# Patient Record
Sex: MALE | Race: WHITE | NOT HISPANIC OR LATINO | URBAN - METROPOLITAN AREA
[De-identification: names, ages, dates, MRNs, and addresses within clinical notes are randomized per-mention and may not be internally consistent; named-entity substitution may affect disease eponyms.]

---

## 2017-01-17 ENCOUNTER — OUTPATIENT (OUTPATIENT)
Dept: OUTPATIENT SERVICES | Facility: HOSPITAL | Age: 77
LOS: 1 days | End: 2017-01-17
Payer: MEDICARE

## 2017-01-17 DIAGNOSIS — J84.9 INTERSTITIAL PULMONARY DISEASE, UNSPECIFIED: ICD-10-CM

## 2017-02-10 ENCOUNTER — APPOINTMENT (OUTPATIENT)
Dept: PULMONOLOGY | Facility: CLINIC | Age: 77
End: 2017-02-10

## 2017-02-10 VITALS
SYSTOLIC BLOOD PRESSURE: 134 MMHG | BODY MASS INDEX: 37.03 KG/M2 | DIASTOLIC BLOOD PRESSURE: 62 MMHG | WEIGHT: 240 LBS | HEART RATE: 64 BPM | OXYGEN SATURATION: 94 %

## 2017-03-17 ENCOUNTER — TRANSCRIPTION ENCOUNTER (OUTPATIENT)
Age: 77
End: 2017-03-17

## 2017-03-21 PROCEDURE — 94618 PULMONARY STRESS TESTING: CPT

## 2017-03-21 PROCEDURE — G0239: CPT

## 2017-03-31 ENCOUNTER — LABORATORY RESULT (OUTPATIENT)
Age: 77
End: 2017-03-31

## 2017-03-31 ENCOUNTER — APPOINTMENT (OUTPATIENT)
Dept: INTERNAL MEDICINE | Facility: CLINIC | Age: 77
End: 2017-03-31

## 2017-03-31 ENCOUNTER — RESULT REVIEW (OUTPATIENT)
Age: 77
End: 2017-03-31

## 2017-03-31 VITALS — WEIGHT: 239 LBS | BODY MASS INDEX: 37.07 KG/M2 | HEIGHT: 67.5 IN

## 2017-03-31 LAB
BILIRUB UR QL STRIP: NORMAL
GLUCOSE UR-MCNC: NORMAL
HBA1C MFR BLD HPLC: 6
HCG UR QL: 0.2 EU/DL
HGB UR QL STRIP.AUTO: NORMAL
KETONES UR-MCNC: NORMAL
LEUKOCYTE ESTERASE UR QL STRIP: NORMAL
NITRITE UR QL STRIP: NORMAL
PH UR STRIP: 5.5
PROT UR STRIP-MCNC: NORMAL
SP GR UR STRIP: 1.02

## 2017-04-01 LAB
ALBUMIN SERPL ELPH-MCNC: 4 G/DL
ALP BLD-CCNC: 48 U/L
ALT SERPL-CCNC: 20 U/L
ANION GAP SERPL CALC-SCNC: 16 MMOL/L
AST SERPL-CCNC: 36 U/L
BASOPHILS # BLD AUTO: 0.04 K/UL
BASOPHILS NFR BLD AUTO: 0.6 %
BILIRUB SERPL-MCNC: 0.5 MG/DL
BUN SERPL-MCNC: 17 MG/DL
CALCIUM SERPL-MCNC: 9.4 MG/DL
CHLORIDE SERPL-SCNC: 103 MMOL/L
CHOLEST SERPL-MCNC: 161 MG/DL
CHOLEST/HDLC SERPL: 3.4 RATIO
CO2 SERPL-SCNC: 24 MMOL/L
CREAT SERPL-MCNC: 1.02 MG/DL
EOSINOPHIL # BLD AUTO: 0.35 K/UL
EOSINOPHIL NFR BLD AUTO: 5.1 %
GLUCOSE SERPL-MCNC: 99 MG/DL
HCT VFR BLD CALC: 44 %
HDLC SERPL-MCNC: 47 MG/DL
HGB BLD-MCNC: 14.4 G/DL
IMM GRANULOCYTES NFR BLD AUTO: 0.3 %
LDLC SERPL CALC-MCNC: 80 MG/DL
LYMPHOCYTES # BLD AUTO: 1.63 K/UL
LYMPHOCYTES NFR BLD AUTO: 23.8 %
MAN DIFF?: NORMAL
MCHC RBC-ENTMCNC: 30.8 PG
MCHC RBC-ENTMCNC: 32.7 GM/DL
MCV RBC AUTO: 94 FL
MONOCYTES # BLD AUTO: 0.65 K/UL
MONOCYTES NFR BLD AUTO: 9.5 %
NEUTROPHILS # BLD AUTO: 4.16 K/UL
NEUTROPHILS NFR BLD AUTO: 60.7 %
PLATELET # BLD AUTO: 217 K/UL
POTASSIUM SERPL-SCNC: 4.7 MMOL/L
PROT SERPL-MCNC: 6.7 G/DL
PSA FREE FLD-MCNC: 14.2 %
PSA FREE SERPL-MCNC: 0.61 NG/ML
PSA SERPL-MCNC: 4.29 NG/ML
RBC # BLD: 4.68 M/UL
RBC # FLD: 14 %
SODIUM SERPL-SCNC: 143 MMOL/L
TRIGL SERPL-MCNC: 168 MG/DL
TSH SERPL-ACNC: 4.84 UIU/ML
WBC # FLD AUTO: 6.85 K/UL

## 2017-04-03 ENCOUNTER — RECORD ABSTRACTING (OUTPATIENT)
Age: 77
End: 2017-04-03

## 2017-04-03 RX ORDER — PIRFENIDONE 267 MG/1
CAPSULE ORAL
Refills: 0 | Status: DISCONTINUED | COMMUNITY
End: 2017-04-03

## 2017-04-04 ENCOUNTER — RESULT REVIEW (OUTPATIENT)
Age: 77
End: 2017-04-04

## 2017-06-06 ENCOUNTER — APPOINTMENT (OUTPATIENT)
Dept: PULMONOLOGY | Facility: CLINIC | Age: 77
End: 2017-06-06

## 2017-06-06 VITALS
WEIGHT: 239 LBS | RESPIRATION RATE: 16 BRPM | SYSTOLIC BLOOD PRESSURE: 128 MMHG | HEART RATE: 65 BPM | OXYGEN SATURATION: 93 % | BODY MASS INDEX: 36.88 KG/M2 | DIASTOLIC BLOOD PRESSURE: 72 MMHG

## 2017-06-28 ENCOUNTER — APPOINTMENT (OUTPATIENT)
Dept: PULMONOLOGY | Facility: CLINIC | Age: 77
End: 2017-06-28

## 2017-06-28 VITALS
SYSTOLIC BLOOD PRESSURE: 136 MMHG | BODY MASS INDEX: 36.42 KG/M2 | DIASTOLIC BLOOD PRESSURE: 60 MMHG | OXYGEN SATURATION: 93 % | WEIGHT: 236 LBS | HEART RATE: 66 BPM

## 2017-06-28 RX ORDER — AMOXICILLIN 500 MG/1
500 CAPSULE ORAL
Qty: 12 | Refills: 0 | Status: DISCONTINUED | COMMUNITY
Start: 2016-11-07

## 2017-07-12 ENCOUNTER — APPOINTMENT (OUTPATIENT)
Dept: PULMONOLOGY | Facility: CLINIC | Age: 77
End: 2017-07-12

## 2017-07-12 VITALS — BODY MASS INDEX: 35.89 KG/M2 | WEIGHT: 232.6 LBS | DIASTOLIC BLOOD PRESSURE: 62 MMHG | SYSTOLIC BLOOD PRESSURE: 132 MMHG

## 2017-07-12 VITALS — HEART RATE: 73 BPM | OXYGEN SATURATION: 91 %

## 2017-07-12 VITALS — OXYGEN SATURATION: 94 %

## 2017-07-12 RX ORDER — CEFUROXIME AXETIL 500 MG/1
500 TABLET ORAL
Refills: 0 | Status: DISCONTINUED | COMMUNITY
Start: 2017-06-28 | End: 2017-07-12

## 2017-07-19 ENCOUNTER — APPOINTMENT (OUTPATIENT)
Dept: INTERNAL MEDICINE | Facility: CLINIC | Age: 77
End: 2017-07-19

## 2017-07-20 ENCOUNTER — RESULT REVIEW (OUTPATIENT)
Age: 77
End: 2017-07-20

## 2017-07-20 LAB
ANION GAP SERPL CALC-SCNC: 15 MMOL/L
BASOPHILS # BLD AUTO: 0.03 K/UL
BASOPHILS NFR BLD AUTO: 0.4 %
BUN SERPL-MCNC: 17 MG/DL
CALCIUM SERPL-MCNC: 9.6 MG/DL
CHLORIDE SERPL-SCNC: 100 MMOL/L
CHOLEST SERPL-MCNC: 148 MG/DL
CHOLEST/HDLC SERPL: 3.4 RATIO
CO2 SERPL-SCNC: 24 MMOL/L
CREAT SERPL-MCNC: 1.09 MG/DL
EOSINOPHIL # BLD AUTO: 0.42 K/UL
EOSINOPHIL NFR BLD AUTO: 5.9 %
GLUCOSE SERPL-MCNC: 108 MG/DL
HCT VFR BLD CALC: 41.1 %
HDLC SERPL-MCNC: 44 MG/DL
HGB BLD-MCNC: 13.4 G/DL
IMM GRANULOCYTES NFR BLD AUTO: 0.3 %
LDLC SERPL CALC-MCNC: 85 MG/DL
LYMPHOCYTES # BLD AUTO: 1.17 K/UL
LYMPHOCYTES NFR BLD AUTO: 16.4 %
MAGNESIUM SERPL-MCNC: 2.3 MG/DL
MAN DIFF?: NORMAL
MCHC RBC-ENTMCNC: 31 PG
MCHC RBC-ENTMCNC: 32.6 GM/DL
MCV RBC AUTO: 95.1 FL
MONOCYTES # BLD AUTO: 0.85 K/UL
MONOCYTES NFR BLD AUTO: 11.9 %
NEUTROPHILS # BLD AUTO: 4.63 K/UL
NEUTROPHILS NFR BLD AUTO: 65.1 %
PLATELET # BLD AUTO: 299 K/UL
POTASSIUM SERPL-SCNC: 4.8 MMOL/L
RBC # BLD: 4.32 M/UL
RBC # FLD: 14.5 %
SODIUM SERPL-SCNC: 139 MMOL/L
T4 FREE SERPL-MCNC: 1.2 NG/DL
TRIGL SERPL-MCNC: 96 MG/DL
TSH SERPL-ACNC: 1.7 UIU/ML
WBC # FLD AUTO: 7.12 K/UL

## 2017-08-23 ENCOUNTER — APPOINTMENT (OUTPATIENT)
Dept: PULMONOLOGY | Facility: CLINIC | Age: 77
End: 2017-08-23
Payer: MEDICARE

## 2017-08-23 VITALS
WEIGHT: 229 LBS | BODY MASS INDEX: 35.52 KG/M2 | HEART RATE: 73 BPM | DIASTOLIC BLOOD PRESSURE: 68 MMHG | OXYGEN SATURATION: 87 % | RESPIRATION RATE: 14 BRPM | TEMPERATURE: 99.1 F | HEIGHT: 67.5 IN | SYSTOLIC BLOOD PRESSURE: 120 MMHG

## 2017-08-23 VITALS — OXYGEN SATURATION: 92 %

## 2017-08-23 PROCEDURE — 94761 N-INVAS EAR/PLS OXIMETRY MLT: CPT

## 2017-08-23 PROCEDURE — 99215 OFFICE O/P EST HI 40 MIN: CPT | Mod: 25

## 2017-09-07 ENCOUNTER — APPOINTMENT (OUTPATIENT)
Dept: PULMONOLOGY | Facility: CLINIC | Age: 77
End: 2017-09-07

## 2017-09-25 ENCOUNTER — RX RENEWAL (OUTPATIENT)
Age: 77
End: 2017-09-25

## 2017-10-16 ENCOUNTER — APPOINTMENT (OUTPATIENT)
Dept: PULMONOLOGY | Facility: CLINIC | Age: 77
End: 2017-10-16
Payer: MEDICARE

## 2017-10-16 VITALS — BODY MASS INDEX: 36.34 KG/M2 | WEIGHT: 235.5 LBS

## 2017-10-16 VITALS
HEART RATE: 72 BPM | DIASTOLIC BLOOD PRESSURE: 62 MMHG | OXYGEN SATURATION: 93 % | SYSTOLIC BLOOD PRESSURE: 110 MMHG | RESPIRATION RATE: 14 BRPM

## 2017-10-16 PROCEDURE — 94729 DIFFUSING CAPACITY: CPT

## 2017-10-16 PROCEDURE — 85018 HEMOGLOBIN: CPT | Mod: QW

## 2017-10-16 PROCEDURE — 94010 BREATHING CAPACITY TEST: CPT

## 2017-10-16 PROCEDURE — 94727 GAS DIL/WSHOT DETER LNG VOL: CPT

## 2017-10-16 PROCEDURE — 99215 OFFICE O/P EST HI 40 MIN: CPT | Mod: 25

## 2017-10-22 ENCOUNTER — TRANSCRIPTION ENCOUNTER (OUTPATIENT)
Age: 77
End: 2017-10-22

## 2017-10-30 ENCOUNTER — TRANSCRIPTION ENCOUNTER (OUTPATIENT)
Age: 77
End: 2017-10-30

## 2017-11-08 ENCOUNTER — TRANSCRIPTION ENCOUNTER (OUTPATIENT)
Age: 77
End: 2017-11-08

## 2017-11-14 ENCOUNTER — APPOINTMENT (OUTPATIENT)
Dept: INTERNAL MEDICINE | Facility: CLINIC | Age: 77
End: 2017-11-14
Payer: MEDICARE

## 2017-11-14 VITALS
BODY MASS INDEX: 35.06 KG/M2 | HEART RATE: 67 BPM | WEIGHT: 226 LBS | OXYGEN SATURATION: 92 % | HEIGHT: 67.5 IN | SYSTOLIC BLOOD PRESSURE: 110 MMHG | DIASTOLIC BLOOD PRESSURE: 60 MMHG

## 2017-11-14 PROCEDURE — 99214 OFFICE O/P EST MOD 30 MIN: CPT | Mod: 25

## 2017-11-14 PROCEDURE — 36415 COLL VENOUS BLD VENIPUNCTURE: CPT

## 2017-11-14 PROCEDURE — 90670 PCV13 VACCINE IM: CPT

## 2017-11-14 PROCEDURE — G0009: CPT

## 2017-11-17 ENCOUNTER — CHART COPY (OUTPATIENT)
Age: 77
End: 2017-11-17

## 2017-11-17 LAB
ALBUMIN SERPL ELPH-MCNC: 4 G/DL
ALP BLD-CCNC: 55 U/L
ALT SERPL-CCNC: 18 U/L
ANION GAP SERPL CALC-SCNC: 16 MMOL/L
AST SERPL-CCNC: 35 U/L
BILIRUB SERPL-MCNC: 0.5 MG/DL
BUN SERPL-MCNC: 18 MG/DL
CALCIUM SERPL-MCNC: 10.2 MG/DL
CHLORIDE SERPL-SCNC: 102 MMOL/L
CHOLEST SERPL-MCNC: 140 MG/DL
CHOLEST/HDLC SERPL: 3.2 RATIO
CO2 SERPL-SCNC: 23 MMOL/L
CREAT SERPL-MCNC: 1.2 MG/DL
GLUCOSE SERPL-MCNC: 102 MG/DL
HDLC SERPL-MCNC: 44 MG/DL
LDLC SERPL CALC-MCNC: 71 MG/DL
POTASSIUM SERPL-SCNC: 5 MMOL/L
PROT SERPL-MCNC: 7.3 G/DL
PSA SERPL-MCNC: 4.91 NG/ML
SODIUM SERPL-SCNC: 141 MMOL/L
TRIGL SERPL-MCNC: 124 MG/DL

## 2017-11-27 ENCOUNTER — RX RENEWAL (OUTPATIENT)
Age: 77
End: 2017-11-27

## 2017-12-29 ENCOUNTER — MEDICATION RENEWAL (OUTPATIENT)
Age: 77
End: 2017-12-29

## 2018-02-12 ENCOUNTER — APPOINTMENT (OUTPATIENT)
Dept: INTERNAL MEDICINE | Facility: CLINIC | Age: 78
End: 2018-02-12
Payer: MEDICARE

## 2018-02-12 VITALS
BODY MASS INDEX: 35.68 KG/M2 | WEIGHT: 230 LBS | HEART RATE: 66 BPM | DIASTOLIC BLOOD PRESSURE: 70 MMHG | HEIGHT: 67.5 IN | SYSTOLIC BLOOD PRESSURE: 118 MMHG | OXYGEN SATURATION: 93 %

## 2018-02-12 DIAGNOSIS — Z23 ENCOUNTER FOR IMMUNIZATION: ICD-10-CM

## 2018-02-12 DIAGNOSIS — Z92.29 PERSONAL HISTORY OF OTHER DRUG THERAPY: ICD-10-CM

## 2018-02-12 PROCEDURE — 99214 OFFICE O/P EST MOD 30 MIN: CPT | Mod: 25

## 2018-02-12 PROCEDURE — 36415 COLL VENOUS BLD VENIPUNCTURE: CPT

## 2018-02-13 LAB
CHOLEST SERPL-MCNC: 153 MG/DL
CHOLEST/HDLC SERPL: 3.2 RATIO
HDLC SERPL-MCNC: 48 MG/DL
LDLC SERPL CALC-MCNC: 79 MG/DL
TRIGL SERPL-MCNC: 129 MG/DL

## 2018-02-14 LAB
ALBUMIN SERPL ELPH-MCNC: 4.1 G/DL
ALP BLD-CCNC: 54 U/L
ALT SERPL-CCNC: 20 U/L
ANION GAP SERPL CALC-SCNC: 13 MMOL/L
AST SERPL-CCNC: 35 U/L
BILIRUB SERPL-MCNC: 0.5 MG/DL
BUN SERPL-MCNC: 19 MG/DL
CALCIUM SERPL-MCNC: 9.8 MG/DL
CHLORIDE SERPL-SCNC: 103 MMOL/L
CO2 SERPL-SCNC: 26 MMOL/L
CREAT SERPL-MCNC: 1.19 MG/DL
GLUCOSE SERPL-MCNC: 108 MG/DL
HBA1C MFR BLD HPLC: 6 %
POTASSIUM SERPL-SCNC: 5.4 MMOL/L
PROT SERPL-MCNC: 7.2 G/DL
PSA SERPL-MCNC: 5.41 NG/ML
SODIUM SERPL-SCNC: 142 MMOL/L

## 2018-02-22 ENCOUNTER — APPOINTMENT (OUTPATIENT)
Dept: UROLOGY | Facility: CLINIC | Age: 78
End: 2018-02-22
Payer: MEDICARE

## 2018-02-22 VITALS
HEART RATE: 67 BPM | OXYGEN SATURATION: 97 % | BODY MASS INDEX: 34.86 KG/M2 | WEIGHT: 230 LBS | SYSTOLIC BLOOD PRESSURE: 129 MMHG | HEIGHT: 68 IN | DIASTOLIC BLOOD PRESSURE: 84 MMHG

## 2018-02-22 DIAGNOSIS — Z87.891 PERSONAL HISTORY OF NICOTINE DEPENDENCE: ICD-10-CM

## 2018-02-22 DIAGNOSIS — Z87.09 PERSONAL HISTORY OF OTHER DISEASES OF THE RESPIRATORY SYSTEM: ICD-10-CM

## 2018-02-22 PROCEDURE — 99205 OFFICE O/P NEW HI 60 MIN: CPT

## 2018-02-23 ENCOUNTER — APPOINTMENT (OUTPATIENT)
Dept: UROLOGY | Facility: CLINIC | Age: 78
End: 2018-02-23

## 2018-02-23 LAB
BILIRUB UR QL STRIP: NORMAL
CLARITY UR: CLEAR
COLLECTION METHOD: NORMAL
GLUCOSE UR-MCNC: NORMAL
HCG UR QL: 0.2 EU/DL
HGB UR QL STRIP.AUTO: NORMAL
KETONES UR-MCNC: NORMAL
LEUKOCYTE ESTERASE UR QL STRIP: NORMAL
NITRITE UR QL STRIP: NORMAL
PH UR STRIP: 5.5
PROT UR STRIP-MCNC: NORMAL
SP GR UR STRIP: 1.03

## 2018-03-12 ENCOUNTER — APPOINTMENT (OUTPATIENT)
Dept: PULMONOLOGY | Facility: CLINIC | Age: 78
End: 2018-03-12
Payer: MEDICARE

## 2018-03-12 VITALS
DIASTOLIC BLOOD PRESSURE: 60 MMHG | OXYGEN SATURATION: 92 % | BODY MASS INDEX: 35.01 KG/M2 | HEIGHT: 68 IN | SYSTOLIC BLOOD PRESSURE: 130 MMHG | WEIGHT: 231 LBS | HEART RATE: 64 BPM

## 2018-03-12 PROCEDURE — 99215 OFFICE O/P EST HI 40 MIN: CPT | Mod: 25

## 2018-03-12 PROCEDURE — 94010 BREATHING CAPACITY TEST: CPT

## 2018-03-21 ENCOUNTER — MEDICATION RENEWAL (OUTPATIENT)
Age: 78
End: 2018-03-21

## 2018-03-29 ENCOUNTER — APPOINTMENT (OUTPATIENT)
Dept: UROLOGY | Facility: CLINIC | Age: 78
End: 2018-03-29
Payer: MEDICARE

## 2018-03-29 VITALS
SYSTOLIC BLOOD PRESSURE: 115 MMHG | HEIGHT: 68 IN | WEIGHT: 230 LBS | DIASTOLIC BLOOD PRESSURE: 62 MMHG | OXYGEN SATURATION: 96 % | BODY MASS INDEX: 34.86 KG/M2 | HEART RATE: 68 BPM

## 2018-03-29 PROCEDURE — 76872 US TRANSRECTAL: CPT

## 2018-03-29 PROCEDURE — 55700: CPT

## 2018-03-31 LAB — CORE LAB BIOPSY: NORMAL

## 2018-04-10 ENCOUNTER — APPOINTMENT (OUTPATIENT)
Dept: UROLOGY | Facility: CLINIC | Age: 78
End: 2018-04-10
Payer: MEDICARE

## 2018-04-10 VITALS
BODY MASS INDEX: 34.86 KG/M2 | WEIGHT: 230 LBS | DIASTOLIC BLOOD PRESSURE: 68 MMHG | HEART RATE: 71 BPM | SYSTOLIC BLOOD PRESSURE: 119 MMHG | HEIGHT: 68 IN | OXYGEN SATURATION: 99 %

## 2018-04-10 PROCEDURE — 99215 OFFICE O/P EST HI 40 MIN: CPT

## 2018-04-13 ENCOUNTER — MEDICATION RENEWAL (OUTPATIENT)
Age: 78
End: 2018-04-13

## 2018-04-17 ENCOUNTER — OUTPATIENT (OUTPATIENT)
Dept: OUTPATIENT SERVICES | Facility: HOSPITAL | Age: 78
LOS: 1 days | Discharge: HOME | End: 2018-04-17

## 2018-04-18 ENCOUNTER — APPOINTMENT (OUTPATIENT)
Dept: RADIATION ONCOLOGY | Facility: CLINIC | Age: 78
End: 2018-04-18

## 2018-05-17 ENCOUNTER — APPOINTMENT (OUTPATIENT)
Dept: INTERNAL MEDICINE | Facility: CLINIC | Age: 78
End: 2018-05-17
Payer: MEDICARE

## 2018-05-17 VITALS
DIASTOLIC BLOOD PRESSURE: 74 MMHG | BODY MASS INDEX: 34.56 KG/M2 | WEIGHT: 228 LBS | HEART RATE: 61 BPM | SYSTOLIC BLOOD PRESSURE: 118 MMHG | HEIGHT: 68 IN | OXYGEN SATURATION: 90 %

## 2018-05-17 PROCEDURE — 36415 COLL VENOUS BLD VENIPUNCTURE: CPT

## 2018-05-17 PROCEDURE — 99214 OFFICE O/P EST MOD 30 MIN: CPT | Mod: 25

## 2018-05-17 RX ORDER — AMOXICILLIN AND CLAVULANATE POTASSIUM 875; 125 MG/1; MG/1
875-125 TABLET, COATED ORAL TWICE DAILY
Qty: 10 | Refills: 0 | Status: DISCONTINUED | COMMUNITY
Start: 2018-03-21 | End: 2018-05-17

## 2018-05-18 LAB
ALBUMIN SERPL ELPH-MCNC: 4.1 G/DL
ALP BLD-CCNC: 56 U/L
ALT SERPL-CCNC: 18 U/L
ANION GAP SERPL CALC-SCNC: 12 MMOL/L
AST SERPL-CCNC: 36 U/L
BILIRUB SERPL-MCNC: 0.4 MG/DL
BUN SERPL-MCNC: 17 MG/DL
CALCIUM SERPL-MCNC: 9.9 MG/DL
CHLORIDE SERPL-SCNC: 104 MMOL/L
CHOLEST SERPL-MCNC: 142 MG/DL
CHOLEST/HDLC SERPL: 3 RATIO
CO2 SERPL-SCNC: 25 MMOL/L
CREAT SERPL-MCNC: 1.17 MG/DL
GLUCOSE SERPL-MCNC: 110 MG/DL
HBA1C MFR BLD HPLC: 5.9 %
HDLC SERPL-MCNC: 48 MG/DL
LDLC SERPL CALC-MCNC: 71 MG/DL
POTASSIUM SERPL-SCNC: 4.9 MMOL/L
PROT SERPL-MCNC: 7.3 G/DL
SODIUM SERPL-SCNC: 141 MMOL/L
TRIGL SERPL-MCNC: 115 MG/DL

## 2018-05-18 NOTE — PHYSICAL EXAM
[No Acute Distress] : no acute distress [No Respiratory Distress] : no respiratory distress  [No Accessory Muscle Use] : no accessory muscle use [Normal Rate] : normal rate  [Regular Rhythm] : with a regular rhythm [Normal S1, S2] : normal S1 and S2 [Normal Affect] : the affect was normal [Normal Insight/Judgement] : insight and judgment were intact [de-identified] : crackles at bases bilaterally

## 2018-05-18 NOTE — REVIEW OF SYSTEMS
[Hearing Loss] : hearing loss [Frequency] : frequency [Negative] : Heme/Lymph [FreeTextEntry6] : LILLY, IPF

## 2018-05-18 NOTE — HISTORY OF PRESENT ILLNESS
[de-identified] : Patient presents for follow up of hypertension, hyperlipidemia, CAD, IPF, prediabetes. He is on amlodipine/atenolol for hypertension, zocor for hyperlipidemia, ASA/plavix for CAD. He is on Esbriet for IPF and uses CPAP with O2 at night. He has portable O2, uses rarely as needed. His lung function is stable. He was recently diagnosed with prostate cancer, will be starting RT on Monday - Dr. Emeka Banks.

## 2018-05-18 NOTE — ASSESSMENT
[FreeTextEntry1] : Blood pressure well controlled. \par Advised low carb diet, weight loss.  \par He is current with cardiology, pulmonology.\par RT to start Monday. \par UTD on pneumovax, prevnar, zoster vaccines. \par Follow up in 3 months.

## 2018-05-19 ENCOUNTER — TRANSCRIPTION ENCOUNTER (OUTPATIENT)
Age: 78
End: 2018-05-19

## 2018-05-22 ENCOUNTER — RX RENEWAL (OUTPATIENT)
Age: 78
End: 2018-05-22

## 2018-05-27 ENCOUNTER — TRANSCRIPTION ENCOUNTER (OUTPATIENT)
Age: 78
End: 2018-05-27

## 2018-06-07 ENCOUNTER — NON-APPOINTMENT (OUTPATIENT)
Age: 78
End: 2018-06-07

## 2018-06-07 ENCOUNTER — APPOINTMENT (OUTPATIENT)
Dept: CARDIOLOGY | Facility: CLINIC | Age: 78
End: 2018-06-07
Payer: MEDICARE

## 2018-06-07 VITALS — OXYGEN SATURATION: 90 % | DIASTOLIC BLOOD PRESSURE: 62 MMHG | HEART RATE: 71 BPM | SYSTOLIC BLOOD PRESSURE: 122 MMHG

## 2018-06-07 PROCEDURE — 93000 ELECTROCARDIOGRAM COMPLETE: CPT

## 2018-06-07 PROCEDURE — 99204 OFFICE O/P NEW MOD 45 MIN: CPT

## 2018-06-27 ENCOUNTER — MEDICATION RENEWAL (OUTPATIENT)
Age: 78
End: 2018-06-27

## 2018-07-17 ENCOUNTER — APPOINTMENT (OUTPATIENT)
Dept: UROLOGY | Facility: CLINIC | Age: 78
End: 2018-07-17
Payer: MEDICARE

## 2018-07-17 VITALS
HEART RATE: 72 BPM | HEIGHT: 68 IN | BODY MASS INDEX: 34.86 KG/M2 | DIASTOLIC BLOOD PRESSURE: 67 MMHG | SYSTOLIC BLOOD PRESSURE: 124 MMHG | WEIGHT: 230 LBS | OXYGEN SATURATION: 97 %

## 2018-07-17 PROCEDURE — 99213 OFFICE O/P EST LOW 20 MIN: CPT

## 2018-07-20 ENCOUNTER — CLINICAL ADVICE (OUTPATIENT)
Age: 78
End: 2018-07-20

## 2018-07-30 ENCOUNTER — CLINICAL ADVICE (OUTPATIENT)
Age: 78
End: 2018-07-30

## 2018-07-31 ENCOUNTER — APPOINTMENT (OUTPATIENT)
Dept: UROLOGY | Facility: CLINIC | Age: 78
End: 2018-07-31
Payer: MEDICARE

## 2018-07-31 VITALS
DIASTOLIC BLOOD PRESSURE: 59 MMHG | SYSTOLIC BLOOD PRESSURE: 109 MMHG | BODY MASS INDEX: 34.86 KG/M2 | WEIGHT: 230 LBS | OXYGEN SATURATION: 92 % | HEIGHT: 68 IN | HEART RATE: 68 BPM

## 2018-07-31 PROCEDURE — 99212 OFFICE O/P EST SF 10 MIN: CPT

## 2018-08-01 RX ORDER — PHENAZOPYRIDINE HYDROCHLORIDE 200 MG/1
200 TABLET ORAL 3 TIMES DAILY
Qty: 84 | Refills: 2 | Status: DISCONTINUED | COMMUNITY
Start: 2018-07-17 | End: 2018-08-01

## 2018-08-08 ENCOUNTER — APPOINTMENT (OUTPATIENT)
Dept: PULMONOLOGY | Facility: CLINIC | Age: 78
End: 2018-08-08
Payer: MEDICARE

## 2018-08-08 VITALS — OXYGEN SATURATION: 92 %

## 2018-08-08 VITALS — DIASTOLIC BLOOD PRESSURE: 60 MMHG | SYSTOLIC BLOOD PRESSURE: 120 MMHG | WEIGHT: 226.6 LBS | BODY MASS INDEX: 34.45 KG/M2

## 2018-08-08 PROCEDURE — 99214 OFFICE O/P EST MOD 30 MIN: CPT

## 2018-08-17 ENCOUNTER — APPOINTMENT (OUTPATIENT)
Dept: INTERNAL MEDICINE | Facility: CLINIC | Age: 78
End: 2018-08-17
Payer: MEDICARE

## 2018-08-17 VITALS
DIASTOLIC BLOOD PRESSURE: 64 MMHG | BODY MASS INDEX: 34.1 KG/M2 | OXYGEN SATURATION: 89 % | HEIGHT: 68 IN | HEART RATE: 65 BPM | WEIGHT: 225 LBS | SYSTOLIC BLOOD PRESSURE: 118 MMHG

## 2018-08-17 PROCEDURE — 99214 OFFICE O/P EST MOD 30 MIN: CPT | Mod: 25

## 2018-08-17 PROCEDURE — 36415 COLL VENOUS BLD VENIPUNCTURE: CPT

## 2018-08-17 NOTE — HISTORY OF PRESENT ILLNESS
[de-identified] : Patient presents for follow up of hypertension, hyperlipidemia, CAD, IPF, prediabetes. He is on amlodipine/atenolol for hypertension, zocor for hyperlipidemia, ASA/plavix for CAD. He is on Esbriet for IPF and uses CPAP with O2 at night. He has portable O2, uses rarely as needed. His lung function is stable. He has been treated for prostate cancer, completed RT, now on oxybutynin, flomax, finasteride. Managing sugar with diet, 20lb weight loss in past year.\par No acute complaints today.

## 2018-08-17 NOTE — PHYSICAL EXAM
[No Acute Distress] : no acute distress [No Respiratory Distress] : no respiratory distress  [No Accessory Muscle Use] : no accessory muscle use [Normal Rate] : normal rate  [Regular Rhythm] : with a regular rhythm [Normal S1, S2] : normal S1 and S2 [Normal Affect] : the affect was normal [Alert and Oriented x3] : oriented to person, place, and time [Normal Insight/Judgement] : insight and judgment were intact [de-identified] : crackles at bases bilaterally

## 2018-08-17 NOTE — ASSESSMENT
[FreeTextEntry1] : Blood pressure well controlled. \par Advised low carb diet, continued weight loss.  \par He is current with cardiology, pulmonology, urology, rad onc.\par UTD on pneumovax, prevnar.\par Follow up in 4 months.

## 2018-08-19 LAB
ALBUMIN SERPL ELPH-MCNC: 4.2 G/DL
ALP BLD-CCNC: 56 U/L
ALT SERPL-CCNC: 15 U/L
ANION GAP SERPL CALC-SCNC: 13 MMOL/L
AST SERPL-CCNC: 29 U/L
BILIRUB SERPL-MCNC: 0.4 MG/DL
BUN SERPL-MCNC: 20 MG/DL
CALCIUM SERPL-MCNC: 9.3 MG/DL
CHLORIDE SERPL-SCNC: 102 MMOL/L
CHOLEST SERPL-MCNC: 137 MG/DL
CHOLEST/HDLC SERPL: 3.3 RATIO
CO2 SERPL-SCNC: 26 MMOL/L
CREAT SERPL-MCNC: 1.09 MG/DL
GLUCOSE SERPL-MCNC: 108 MG/DL
HBA1C MFR BLD HPLC: 5.8 %
HDLC SERPL-MCNC: 41 MG/DL
LDLC SERPL CALC-MCNC: 76 MG/DL
POTASSIUM SERPL-SCNC: 4.7 MMOL/L
PROT SERPL-MCNC: 6.9 G/DL
SODIUM SERPL-SCNC: 141 MMOL/L
TRIGL SERPL-MCNC: 101 MG/DL

## 2018-08-30 ENCOUNTER — APPOINTMENT (OUTPATIENT)
Dept: UROLOGY | Facility: CLINIC | Age: 78
End: 2018-08-30
Payer: MEDICARE

## 2018-08-30 VITALS
DIASTOLIC BLOOD PRESSURE: 61 MMHG | BODY MASS INDEX: 34.1 KG/M2 | HEART RATE: 71 BPM | HEIGHT: 68 IN | WEIGHT: 225 LBS | SYSTOLIC BLOOD PRESSURE: 121 MMHG

## 2018-08-30 PROCEDURE — 99213 OFFICE O/P EST LOW 20 MIN: CPT

## 2018-09-06 ENCOUNTER — MEDICATION RENEWAL (OUTPATIENT)
Age: 78
End: 2018-09-06

## 2018-10-10 ENCOUNTER — APPOINTMENT (OUTPATIENT)
Dept: INTERNAL MEDICINE | Facility: CLINIC | Age: 78
End: 2018-10-10
Payer: MEDICARE

## 2018-10-10 PROCEDURE — 90662 IIV NO PRSV INCREASED AG IM: CPT

## 2018-10-10 PROCEDURE — G0008: CPT

## 2018-11-12 ENCOUNTER — APPOINTMENT (OUTPATIENT)
Dept: PULMONOLOGY | Facility: CLINIC | Age: 78
End: 2018-11-12
Payer: MEDICARE

## 2018-11-12 VITALS — HEART RATE: 63 BPM | DIASTOLIC BLOOD PRESSURE: 80 MMHG | SYSTOLIC BLOOD PRESSURE: 120 MMHG | OXYGEN SATURATION: 91 %

## 2018-11-12 VITALS — WEIGHT: 219.5 LBS | BODY MASS INDEX: 33.38 KG/M2

## 2018-11-12 PROCEDURE — 94727 GAS DIL/WSHOT DETER LNG VOL: CPT

## 2018-11-12 PROCEDURE — 85018 HEMOGLOBIN: CPT | Mod: QW

## 2018-11-12 PROCEDURE — 94729 DIFFUSING CAPACITY: CPT

## 2018-11-12 PROCEDURE — 99215 OFFICE O/P EST HI 40 MIN: CPT | Mod: 25

## 2018-11-12 PROCEDURE — 94010 BREATHING CAPACITY TEST: CPT

## 2018-11-20 ENCOUNTER — MEDICATION RENEWAL (OUTPATIENT)
Age: 78
End: 2018-11-20

## 2018-12-12 ENCOUNTER — RX RENEWAL (OUTPATIENT)
Age: 78
End: 2018-12-12

## 2018-12-13 ENCOUNTER — NON-APPOINTMENT (OUTPATIENT)
Age: 78
End: 2018-12-13

## 2018-12-13 ENCOUNTER — APPOINTMENT (OUTPATIENT)
Dept: CARDIOLOGY | Facility: CLINIC | Age: 78
End: 2018-12-13
Payer: MEDICARE

## 2018-12-13 VITALS
HEIGHT: 68 IN | HEART RATE: 59 BPM | RESPIRATION RATE: 16 BRPM | BODY MASS INDEX: 33.04 KG/M2 | WEIGHT: 218 LBS | SYSTOLIC BLOOD PRESSURE: 94 MMHG | OXYGEN SATURATION: 95 % | DIASTOLIC BLOOD PRESSURE: 58 MMHG

## 2018-12-13 PROCEDURE — 99214 OFFICE O/P EST MOD 30 MIN: CPT

## 2018-12-13 PROCEDURE — 93000 ELECTROCARDIOGRAM COMPLETE: CPT

## 2018-12-13 RX ORDER — MUPIROCIN 20 MG/G
2 OINTMENT TOPICAL
Qty: 22 | Refills: 0 | Status: DISCONTINUED | COMMUNITY
Start: 2017-08-18 | End: 2018-12-13

## 2018-12-13 RX ORDER — CETIRIZINE HYDROCHLORIDE 10 MG/1
10 CAPSULE, LIQUID FILLED ORAL
Refills: 0 | Status: DISCONTINUED | COMMUNITY
Start: 2017-06-28 | End: 2018-12-13

## 2018-12-13 RX ORDER — CLOBETASOL PROPIONATE 0.5 MG/G
0.05 OINTMENT TOPICAL
Qty: 60 | Refills: 0 | Status: DISCONTINUED | COMMUNITY
Start: 2017-08-18 | End: 2018-12-13

## 2018-12-13 NOTE — HISTORY OF PRESENT ILLNESS
[FreeTextEntry1] : Patient with history of hypertension, hyperlipidemia, carotid disease, IPF presenting for routine evaluation. Has history of shortness of breath. Uses O2. No history of significant chest pain. \par Unclear why patient uses plavix. Has had significant bruising. \par \par 12/2018- stable shortness of breath. \par

## 2018-12-13 NOTE — PHYSICAL EXAM
[General Appearance - Well Developed] : well developed [Normal Conjunctiva] : the conjunctiva exhibited no abnormalities [Normal Oral Mucosa] : normal oral mucosa [Normal Jugular Venous V Waves Present] : normal jugular venous V waves present [Respiration, Rhythm And Depth] : normal respiratory rhythm and effort [FreeTextEntry1] : crackles in lung.  [Heart Rate And Rhythm] : heart rate and rhythm were normal [Heart Sounds] : normal S1 and S2 [Bowel Sounds] : normal bowel sounds [Abdomen Soft] : soft [Abnormal Walk] : normal gait [Nail Clubbing] : no clubbing of the fingernails [Cyanosis, Localized] : no localized cyanosis [Skin Color & Pigmentation] : normal skin color and pigmentation [Skin Turgor] : normal skin turgor [] : no rash [Oriented To Time, Place, And Person] : oriented to person, place, and time [Impaired Insight] : insight and judgment were intact [No Anxiety] : not feeling anxious

## 2018-12-13 NOTE — DISCUSSION/SUMMARY
[FreeTextEntry1] : 1. Abnormal EKG: New RBBB. No syncopal episode. TTE to assess LV function. \par 2. Hypotension: relative. Plan to decrease atenolol to 25 mg. Plan to stop at the next visit. \par 3. Follow up in 6 weeks.

## 2018-12-17 ENCOUNTER — APPOINTMENT (OUTPATIENT)
Dept: INTERNAL MEDICINE | Facility: CLINIC | Age: 78
End: 2018-12-17
Payer: MEDICARE

## 2018-12-17 VITALS
WEIGHT: 216.25 LBS | DIASTOLIC BLOOD PRESSURE: 68 MMHG | BODY MASS INDEX: 32.77 KG/M2 | OXYGEN SATURATION: 90 % | HEART RATE: 71 BPM | HEIGHT: 68 IN | SYSTOLIC BLOOD PRESSURE: 118 MMHG

## 2018-12-17 DIAGNOSIS — I73.9 PERIPHERAL VASCULAR DISEASE, UNSPECIFIED: ICD-10-CM

## 2018-12-17 PROCEDURE — 36415 COLL VENOUS BLD VENIPUNCTURE: CPT

## 2018-12-17 PROCEDURE — 99214 OFFICE O/P EST MOD 30 MIN: CPT | Mod: 25

## 2018-12-17 NOTE — HISTORY OF PRESENT ILLNESS
[de-identified] : Patient presents for follow up of hypertension, hyperlipidemia, CAD, IPF, prediabetes. He is on amlodipine/atenolol for hypertension, zocor for hyperlipidemia, ASA/plavix for CAD. He is on Esbriet for IPF and uses CPAP with O2 at night. He has portable O2, uses rarely as needed. His lung function is stable, MORALEZ at baseline. He has been treated for prostate cancer, completed RT, now on flomax, finasteride. Managing sugar with diet, 20lb weight loss in past year.\par No acute complaints today.

## 2018-12-17 NOTE — PHYSICAL EXAM
[No Acute Distress] : no acute distress [No Respiratory Distress] : no respiratory distress  [No Accessory Muscle Use] : no accessory muscle use [Normal Rate] : normal rate  [Regular Rhythm] : with a regular rhythm [Normal S1, S2] : normal S1 and S2 [Normal Affect] : the affect was normal [Alert and Oriented x3] : oriented to person, place, and time [Normal Insight/Judgement] : insight and judgment were intact [de-identified] : crackles at bases bilaterally

## 2018-12-17 NOTE — REVIEW OF SYSTEMS
[Dyspnea on Exertion] : dyspnea on exertion [Frequency] : frequency [Negative] : Heme/Lymph [FreeTextEntry6] : LILLY, IPF

## 2018-12-17 NOTE — ASSESSMENT
[FreeTextEntry1] : Blood pressure well controlled. \par Advised low carb diet, continued weight loss.  \par He is current with cardiology, pulmonology, urology, rad onc.\par UTD on pneumovax, prevnar, flu vaccines.\par Follow up in 4 months.

## 2018-12-18 ENCOUNTER — OTHER (OUTPATIENT)
Age: 78
End: 2018-12-18

## 2018-12-18 LAB
ALBUMIN SERPL ELPH-MCNC: 3.9 G/DL
ALP BLD-CCNC: 59 U/L
ALT SERPL-CCNC: 17 U/L
ANION GAP SERPL CALC-SCNC: 9 MMOL/L
AST SERPL-CCNC: 26 U/L
BILIRUB SERPL-MCNC: 0.4 MG/DL
BUN SERPL-MCNC: 20 MG/DL
CALCIUM SERPL-MCNC: 9.3 MG/DL
CHLORIDE SERPL-SCNC: 106 MMOL/L
CHOLEST SERPL-MCNC: 127 MG/DL
CHOLEST/HDLC SERPL: 3 RATIO
CO2 SERPL-SCNC: 27 MMOL/L
CREAT SERPL-MCNC: 0.98 MG/DL
GLUCOSE SERPL-MCNC: 114 MG/DL
HBA1C MFR BLD HPLC: 6.2 %
HDLC SERPL-MCNC: 42 MG/DL
LDLC SERPL CALC-MCNC: 64 MG/DL
POTASSIUM SERPL-SCNC: 4.3 MMOL/L
PROT SERPL-MCNC: 7.1 G/DL
PSA SERPL-MCNC: 0.64 NG/ML
SODIUM SERPL-SCNC: 142 MMOL/L
TESTOST SERPL-MCNC: 421.1 NG/DL
TRIGL SERPL-MCNC: 103 MG/DL

## 2019-01-17 ENCOUNTER — APPOINTMENT (OUTPATIENT)
Dept: CARDIOLOGY | Facility: CLINIC | Age: 79
End: 2019-01-17
Payer: MEDICARE

## 2019-01-17 ENCOUNTER — APPOINTMENT (OUTPATIENT)
Dept: CARDIOLOGY | Facility: CLINIC | Age: 79
End: 2019-01-17

## 2019-01-17 VITALS
SYSTOLIC BLOOD PRESSURE: 116 MMHG | HEIGHT: 68 IN | HEART RATE: 72 BPM | WEIGHT: 214.5 LBS | OXYGEN SATURATION: 95 % | BODY MASS INDEX: 32.51 KG/M2 | DIASTOLIC BLOOD PRESSURE: 60 MMHG

## 2019-01-17 PROCEDURE — 93306 TTE W/DOPPLER COMPLETE: CPT

## 2019-01-29 ENCOUNTER — APPOINTMENT (OUTPATIENT)
Dept: CARDIOLOGY | Facility: CLINIC | Age: 79
End: 2019-01-29
Payer: MEDICARE

## 2019-01-29 VITALS
HEART RATE: 67 BPM | WEIGHT: 214 LBS | OXYGEN SATURATION: 95 % | HEIGHT: 68 IN | BODY MASS INDEX: 32.43 KG/M2 | SYSTOLIC BLOOD PRESSURE: 100 MMHG | DIASTOLIC BLOOD PRESSURE: 50 MMHG

## 2019-01-29 VITALS — DIASTOLIC BLOOD PRESSURE: 50 MMHG | SYSTOLIC BLOOD PRESSURE: 100 MMHG

## 2019-01-29 DIAGNOSIS — J30.2 OTHER SEASONAL ALLERGIC RHINITIS: ICD-10-CM

## 2019-01-29 PROCEDURE — 99214 OFFICE O/P EST MOD 30 MIN: CPT

## 2019-02-19 ENCOUNTER — MEDICATION RENEWAL (OUTPATIENT)
Age: 79
End: 2019-02-19

## 2019-02-28 ENCOUNTER — APPOINTMENT (OUTPATIENT)
Dept: UROLOGY | Facility: CLINIC | Age: 79
End: 2019-02-28
Payer: MEDICARE

## 2019-02-28 PROCEDURE — 99214 OFFICE O/P EST MOD 30 MIN: CPT

## 2019-02-28 NOTE — PHYSICAL EXAM
[General Appearance - Well Developed] : well developed [General Appearance - Well Nourished] : well nourished [Normal Appearance] : normal appearance [Well Groomed] : well groomed [General Appearance - In No Acute Distress] : no acute distress [Bowel Sounds] : normal bowel sounds [Abdomen Soft] : soft [Abdomen Tenderness] : non-tender [Abdomen Mass (___ Cm)] : no abdominal mass palpated [Costovertebral Angle Tenderness] : no ~M costovertebral angle tenderness [] : no rash [Edema] : no peripheral edema [Oriented To Time, Place, And Person] : oriented to person, place, and time [Affect] : the affect was normal [Mood] : the mood was normal [Not Anxious] : not anxious [Normal Station and Gait] : the gait and station were normal for the patient's age [No Focal Deficits] : no focal deficits

## 2019-02-28 NOTE — ASSESSMENT
[FreeTextEntry1] : #1) radiation cystitis: Continue oxybutynin ER 10 mg daily- refill needed.\par \par #2) BPH: Continue tamsulosin one capsule and finasteride- refills needed.\par \par #3) Prostate cancer: Status post radiation completed on 6/22/18 at [facility]\par 12/18/18 PSA 0.64\par PSA in 6 months\par \par He'll return in 6 months for reevaluation.

## 2019-02-28 NOTE — HISTORY OF PRESENT ILLNESS
[FreeTextEntry1] : The patient is a 77-year-old gentleman who was seen in the office today for the above. He is presently on tamsulosin one capsule, and finasteride for BPH. He is also on oxybutynin ER 10 mg daily for symptoms of radiation cystitis. He discontinued the Pyridium because it bothered him.\par His daytime frequency has decreased to 4 times a day, his nocturia has decreased to twice a night, the urgency and urge incontinence have resolved. Unfortunately, he ran out of the oxybutynin ER 10 mg 2 months ago and was not aware of having to renew it so the urgency and urge incontinence recurred. He incontinence happens about twice a week. He is not using pads and does not to change his underclothes. However, he still has dysuria but it is tolerable.\par \par His past medical history, surgical history, medications history and allergy history are unchanged.

## 2019-03-01 ENCOUNTER — TRANSCRIPTION ENCOUNTER (OUTPATIENT)
Age: 79
End: 2019-03-01

## 2019-03-13 ENCOUNTER — APPOINTMENT (OUTPATIENT)
Dept: PULMONOLOGY | Facility: CLINIC | Age: 79
End: 2019-03-13
Payer: MEDICARE

## 2019-03-13 VITALS
BODY MASS INDEX: 32.69 KG/M2 | OXYGEN SATURATION: 94 % | HEART RATE: 88 BPM | DIASTOLIC BLOOD PRESSURE: 62 MMHG | SYSTOLIC BLOOD PRESSURE: 122 MMHG | WEIGHT: 215 LBS

## 2019-03-13 PROCEDURE — 99215 OFFICE O/P EST HI 40 MIN: CPT

## 2019-03-13 NOTE — PROCEDURE
[FreeTextEntry1] : CXR 10/16 SSH chronic interstitial changes\par CT reviewed 9/17; no change coarse interstitial infiltrates\par PFts: severe restrictive impairment with severely impaired DLCO\par \par \par

## 2019-03-13 NOTE — HISTORY OF PRESENT ILLNESS
[FreeTextEntry1] : doing well, no new pulmonary complaints\par dyspnea at baseline\par  using cpap with 02 2L, nocturnal\par not using portable\par no fever, chills or chest pain\par no sig sputum\par remains on esbriet,

## 2019-03-13 NOTE — PHYSICAL EXAM
[General Appearance - Well Developed] : well developed [Normal Appearance] : normal appearance [General Appearance - Well Nourished] : well nourished [Heart Rate And Rhythm] : heart rate and rhythm were normal [Heart Sounds] : normal S1 and S2 [Murmurs] : no murmurs present [Edema] : no peripheral edema present [Respiration, Rhythm And Depth] : normal respiratory rhythm and effort [Exaggerated Use Of Accessory Muscles For Inspiration] : no accessory muscle use [Abnormal Walk] : normal gait [Nail Clubbing] : no clubbing of the fingernails [Cyanosis, Localized] : no localized cyanosis [] : no rash [No Focal Deficits] : no focal deficits [Oriented To Time, Place, And Person] : oriented to person, place, and time [Impaired Insight] : insight and judgment were intact [Affect] : the affect was normal [Memory Recent] : recent memory was not impaired [FreeTextEntry1] : no chest wall abn

## 2019-03-13 NOTE — CONSULT LETTER
[Dear  ___] : Dear  [unfilled], [Consult Letter:] : I had the pleasure of evaluating your patient, [unfilled]. [Please see my note below.] : Please see my note below. [Consult Closing:] : Thank you very much for allowing me to participate in the care of this patient.  If you have any questions, please do not hesitate to contact me. [Sincerely,] : Sincerely, [[facility], [facility]] : [facility], [facility] [Director, Respiratory Care] : Director, Respiratory Care [[facility]] : [facility] [FreeTextEntry3] : [facility]\par Pulmonary Critical Care\par Director Pulmonary Division\par Medical Director Respiratory Therapy\par [facility]\par \par

## 2019-03-13 NOTE — REVIEW OF SYSTEMS
[Nasal Congestion] : nasal congestion [As Noted in HPI] : as noted in HPI [Frequency] : urinary frequency [Negative] : Psychiatric [FreeTextEntry5] : BPH

## 2019-03-19 ENCOUNTER — LABORATORY RESULT (OUTPATIENT)
Age: 79
End: 2019-03-19

## 2019-03-19 ENCOUNTER — APPOINTMENT (OUTPATIENT)
Dept: INTERNAL MEDICINE | Facility: CLINIC | Age: 79
End: 2019-03-19
Payer: MEDICARE

## 2019-03-19 ENCOUNTER — FORM ENCOUNTER (OUTPATIENT)
Age: 79
End: 2019-03-19

## 2019-03-19 VITALS
SYSTOLIC BLOOD PRESSURE: 130 MMHG | WEIGHT: 215 LBS | BODY MASS INDEX: 32.58 KG/M2 | HEIGHT: 68 IN | HEART RATE: 87 BPM | OXYGEN SATURATION: 98 % | DIASTOLIC BLOOD PRESSURE: 60 MMHG

## 2019-03-19 PROCEDURE — 36415 COLL VENOUS BLD VENIPUNCTURE: CPT

## 2019-03-19 PROCEDURE — G0444 DEPRESSION SCREEN ANNUAL: CPT | Mod: 59

## 2019-03-19 PROCEDURE — 99213 OFFICE O/P EST LOW 20 MIN: CPT | Mod: 25

## 2019-03-19 PROCEDURE — G0439: CPT

## 2019-03-19 NOTE — ASSESSMENT
[FreeTextEntry1] : Blood pressure improved off atenolol. Continue amlodipine.  \par Advised low carb diet, continued weight loss. \par Labs drawn in office. Further recommendations based on labs. \par LLQ pain for a month with no change in bowel habits - point tenderness on exam seems more like abdominal wall pain. Will get abdominal US for further evaluation. \par He is current with cardiology, pulmonology, urology, rad onc.\par UTD on pneumovax, prevnar, flu vaccines.\par Follow up in 4 months.

## 2019-03-19 NOTE — REVIEW OF SYSTEMS
[Dyspnea on Exertion] : dyspnea on exertion [Frequency] : frequency [Negative] : Musculoskeletal [FreeTextEntry6] : LILLY, IPF [FreeTextEntry7] : LLQ pain

## 2019-03-19 NOTE — HEALTH RISK ASSESSMENT
[Fair] : ~his/her~ current health as fair  [Good] : ~his/her~  mood as  good [No falls in past year] : Patient reported no falls in the past year [0] : 2) Feeling down, depressed, or hopeless: Not at all (0) [None] : None [With Significant Other] : lives with significant other [Retired] : retired [] :  [# Of Children ___] : has [unfilled] children [Feels Safe at Home] : Feels safe at home [Fully functional (bathing, dressing, toileting, transferring, walking, feeding)] : Fully functional (bathing, dressing, toileting, transferring, walking, feeding) [Fully functional (using the telephone, shopping, preparing meals, housekeeping, doing laundry, using] : Fully functional and needs no help or supervision to perform IADLs (using the telephone, shopping, preparing meals, housekeeping, doing laundry, using transportation, managing medications and managing finances) [Reports changes in hearing] : Reports changes in hearing [Reports normal functional visual acuity (ie: able to read med bottle)] : Reports normal functional visual acuity [Smoke Detector] : smoke detector [Carbon Monoxide Detector] : carbon monoxide detector [Guns at Home] : guns at home [Safety elements used in home] : safety elements used in home [Seat Belt] :  uses seat belt [With Patient/Caregiver] : With Patient/Caregiver [Designated Healthcare Proxy] : Designated healthcare proxy [Name: ___] : Health Care Proxy's Name: [unfilled]  [Relationship: ___] : Relationship: [unfilled] [FreeTextEntry1] : breathing [] : No [de-identified] : Cards - Dr. Qureshi, Pulm - Dr. Padilla, Uro - Dr. Ocampo  [de-identified] : none [de-identified] : no alcohol, eats less than he used to, 30lb weight loss in last year [de-identified] : minimal - some walking, due to ILD [[id number]] : 0 [Change in mental status noted] : No change in mental status noted [Language] : denies difficulty with language [Behavior] : denies difficulty with behavior [Handling Complex Tasks] : denies difficulty handling complex tasks [Learning/Retaining New Information] : denies difficulty learning/retaining new information [Reasoning] : denies difficulty with reasoning [Spatial Ability and Orientation] : denies difficulty with spatial ability and orientation [Reports changes in vision] : Reports no changes in vision [Reports changes in dental health] : Reports no changes in dental health [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

## 2019-03-19 NOTE — COUNSELING
[Weight management counseling provided] : Weight management [Healthy eating counseling provided] : healthy eating [Activity counseling provided] : activity [Fall prevention counseling provided] : fall prevention  [ - Annual Depression Screening] : Annual Depression Screening

## 2019-03-19 NOTE — HISTORY OF PRESENT ILLNESS
[de-identified] : Patient presents for CPE. History is significant for hypertension, hyperlipidemia, CAD, IPF, prediabetes, prostate cancer. He is on amlodipine for hypertension, recently stopped atenolol per cards due to low BP, zocor for hyperlipidemia, ASA/plavix for CAD. He is on Esbriet for IPF and uses CPAP with O2 at night. He has portable O2, uses rarely as needed. His lung function is stable, MORALEZ at baseline. He has been treated for prostate cancer, completed RT, now on flomax, finasteride. Managing sugar with diet, 30lb weight loss in past year.\par \par Complains of intermittent dull left lower quadrant ache for the past month, worse with lying down. No change in bowel habits. Hasn't taken anything for it. Never had this before. Concerned for hernia due to chronic cough.

## 2019-03-19 NOTE — PHYSICAL EXAM
[No Acute Distress] : no acute distress [No Respiratory Distress] : no respiratory distress  [No Accessory Muscle Use] : no accessory muscle use [Normal Rate] : normal rate  [Regular Rhythm] : with a regular rhythm [Normal S1, S2] : normal S1 and S2 [Alert and Oriented x3] : oriented to person, place, and time [Normal Affect] : the affect was normal [Normal Insight/Judgement] : insight and judgment were intact [Normal Sclera/Conjunctiva] : normal sclera/conjunctiva [PERRL] : pupils equal round and reactive to light [EOMI] : extraocular movements intact [Normal Outer Ear/Nose] : the outer ears and nose were normal in appearance [Normal Oropharynx] : the oropharynx was normal [No Carotid Bruits] : no carotid bruits [No Abdominal Bruit] : a ~M bruit was not heard ~T in the abdomen [No Varicosities] : no varicosities [Pedal Pulses Present] : the pedal pulses are present [No Edema] : there was no peripheral edema [No Extremity Clubbing/Cyanosis] : no extremity clubbing/cyanosis [Soft] : abdomen soft [Non-distended] : non-distended [No Masses] : no abdominal mass palpated [No HSM] : no HSM [Normal Bowel Sounds] : normal bowel sounds [Normal Supraclavicular Nodes] : no supraclavicular lymphadenopathy [Normal Anterior Cervical Nodes] : no anterior cervical lymphadenopathy [No Spinal Tenderness] : no spinal tenderness [No CVA Tenderness] : no CVA  tenderness [No Joint Swelling] : no joint swelling [Grossly Normal Strength/Tone] : grossly normal strength/tone [No Rash] : no rash [Normal Gait] : normal gait [Coordination Grossly Intact] : coordination grossly intact [No Focal Deficits] : no focal deficits [de-identified] : cerumen in left ear canal [de-identified] : crackles at bases bilaterally [de-identified] : point tenderness in LLQ, no rebound or guarding

## 2019-03-20 ENCOUNTER — OUTPATIENT (OUTPATIENT)
Dept: OUTPATIENT SERVICES | Facility: HOSPITAL | Age: 79
LOS: 1 days | End: 2019-03-20
Payer: MEDICARE

## 2019-03-20 ENCOUNTER — APPOINTMENT (OUTPATIENT)
Dept: ULTRASOUND IMAGING | Facility: CLINIC | Age: 79
End: 2019-03-20
Payer: MEDICARE

## 2019-03-20 DIAGNOSIS — Z00.8 ENCOUNTER FOR OTHER GENERAL EXAMINATION: ICD-10-CM

## 2019-03-20 LAB
ALBUMIN SERPL ELPH-MCNC: 4.1 G/DL
ALP BLD-CCNC: 62 U/L
ALT SERPL-CCNC: 15 U/L
ANION GAP SERPL CALC-SCNC: 14 MMOL/L
APPEARANCE: ABNORMAL
AST SERPL-CCNC: 29 U/L
BACTERIA: ABNORMAL
BASOPHILS # BLD AUTO: 0.07 K/UL
BASOPHILS NFR BLD AUTO: 1 %
BILIRUB SERPL-MCNC: 0.3 MG/DL
BILIRUBIN URINE: NEGATIVE
BLOOD URINE: ABNORMAL
BUN SERPL-MCNC: 20 MG/DL
CALCIUM SERPL-MCNC: 10 MG/DL
CHLORIDE SERPL-SCNC: 103 MMOL/L
CHOLEST SERPL-MCNC: 141 MG/DL
CHOLEST/HDLC SERPL: 3.1 RATIO
CO2 SERPL-SCNC: 27 MMOL/L
COLOR: NORMAL
CREAT SERPL-MCNC: 1.19 MG/DL
EOSINOPHIL # BLD AUTO: 0.37 K/UL
EOSINOPHIL NFR BLD AUTO: 5.1 %
GLUCOSE QUALITATIVE U: NEGATIVE
GLUCOSE SERPL-MCNC: 102 MG/DL
HBA1C MFR BLD HPLC: 5.9 %
HCT VFR BLD CALC: 44.5 %
HDLC SERPL-MCNC: 46 MG/DL
HGB BLD-MCNC: 13.5 G/DL
HYALINE CASTS: 0 /LPF
IMM GRANULOCYTES NFR BLD AUTO: 0.3 %
KETONES URINE: NEGATIVE
LDLC SERPL CALC-MCNC: 71 MG/DL
LEUKOCYTE ESTERASE URINE: ABNORMAL
LYMPHOCYTES # BLD AUTO: 1.27 K/UL
LYMPHOCYTES NFR BLD AUTO: 17.4 %
MAN DIFF?: NORMAL
MCHC RBC-ENTMCNC: 29.1 PG
MCHC RBC-ENTMCNC: 30.3 GM/DL
MCV RBC AUTO: 95.9 FL
MICROSCOPIC-UA: NORMAL
MONOCYTES # BLD AUTO: 0.61 K/UL
MONOCYTES NFR BLD AUTO: 8.3 %
NEUTROPHILS # BLD AUTO: 4.97 K/UL
NEUTROPHILS NFR BLD AUTO: 67.9 %
NITRITE URINE: NEGATIVE
PH URINE: 6
PLATELET # BLD AUTO: 225 K/UL
POTASSIUM SERPL-SCNC: 4.2 MMOL/L
PROT SERPL-MCNC: 7.5 G/DL
PROTEIN URINE: NORMAL
RBC # BLD: 4.64 M/UL
RBC # FLD: 15 %
RED BLOOD CELLS URINE: 6 /HPF
SODIUM SERPL-SCNC: 144 MMOL/L
SPECIFIC GRAVITY URINE: 1.01
SQUAMOUS EPITHELIAL CELLS: 4 /HPF
TRIGL SERPL-MCNC: 119 MG/DL
TSH SERPL-ACNC: 4.19 UIU/ML
UROBILINOGEN URINE: NORMAL
WBC # FLD AUTO: 7.31 K/UL
WHITE BLOOD CELLS URINE: 60 /HPF

## 2019-03-20 PROCEDURE — 76705 ECHO EXAM OF ABDOMEN: CPT | Mod: 26

## 2019-03-20 PROCEDURE — 76705 ECHO EXAM OF ABDOMEN: CPT

## 2019-05-02 ENCOUNTER — APPOINTMENT (OUTPATIENT)
Dept: CARDIOLOGY | Facility: CLINIC | Age: 79
End: 2019-05-02
Payer: MEDICARE

## 2019-05-02 ENCOUNTER — NON-APPOINTMENT (OUTPATIENT)
Age: 79
End: 2019-05-02

## 2019-05-02 VITALS — WEIGHT: 217 LBS | HEIGHT: 68 IN | BODY MASS INDEX: 32.89 KG/M2

## 2019-05-02 VITALS
DIASTOLIC BLOOD PRESSURE: 68 MMHG | SYSTOLIC BLOOD PRESSURE: 112 MMHG | HEART RATE: 82 BPM | OXYGEN SATURATION: 96 % | RESPIRATION RATE: 20 BRPM

## 2019-05-02 PROCEDURE — 93000 ELECTROCARDIOGRAM COMPLETE: CPT

## 2019-05-02 PROCEDURE — 99214 OFFICE O/P EST MOD 30 MIN: CPT

## 2019-05-02 NOTE — PHYSICAL EXAM
[Normal Conjunctiva] : the conjunctiva exhibited no abnormalities [General Appearance - Well Developed] : well developed [Normal Oral Mucosa] : normal oral mucosa [Respiration, Rhythm And Depth] : normal respiratory rhythm and effort [Normal Jugular Venous V Waves Present] : normal jugular venous V waves present [Heart Sounds] : normal S1 and S2 [Heart Rate And Rhythm] : heart rate and rhythm were normal [Abdomen Soft] : soft [Abnormal Walk] : normal gait [Bowel Sounds] : normal bowel sounds [Cyanosis, Localized] : no localized cyanosis [Nail Clubbing] : no clubbing of the fingernails [Oriented To Time, Place, And Person] : oriented to person, place, and time [Skin Turgor] : normal skin turgor [] : no rash [Skin Color & Pigmentation] : normal skin color and pigmentation [Impaired Insight] : insight and judgment were intact [No Anxiety] : not feeling anxious

## 2019-05-03 NOTE — HISTORY OF PRESENT ILLNESS
[FreeTextEntry1] : Patient with history of hypertension, hyperlipidemia, carotid disease, IPF presenting for routine evaluation. Has history of shortness of breath. Uses O2. No history of significant chest pain. \par Unclear why patient uses plavix. Has had significant bruising. \par \par 12/2018- stable shortness of breath. \par \par 5/2/2019\par Patient reports for f/u visit. BP and HR stable at this time 112/68, 82. He states he feels well,  He denies any chest pain, palpitation, SOB, orthopnea, PND, abdominal pain, nausea, vomiting, melena, hematuria and syncope or near syncope.  Patient has been compliant with his medical therapy. Last echo was in (1/17/2019) LVEF- 67%.\par

## 2019-05-03 NOTE — DISCUSSION/SUMMARY
[Patient] : the patient [Risks] : risks [Benefits] : benefits [FreeTextEntry1] : Plan\par 1. Abnormal EKG: New RBBB. No syncopal episode. TTE  (1/17/2019) LVEF- 65%\par 2. HTN:  Cont. Amlodipine 5 mg.\par 3. ordered carotid u/s week before the next appt. \par 4. Follow up in 6 weeks.\par \par Ale Frederick NP

## 2019-05-07 ENCOUNTER — MEDICATION RENEWAL (OUTPATIENT)
Age: 79
End: 2019-05-07

## 2019-05-08 ENCOUNTER — RX RENEWAL (OUTPATIENT)
Age: 79
End: 2019-05-08

## 2019-05-09 ENCOUNTER — TRANSCRIPTION ENCOUNTER (OUTPATIENT)
Age: 79
End: 2019-05-09

## 2019-06-07 ENCOUNTER — APPOINTMENT (OUTPATIENT)
Dept: INTERNAL MEDICINE | Facility: CLINIC | Age: 79
End: 2019-06-07
Payer: MEDICARE

## 2019-06-07 VITALS
BODY MASS INDEX: 32.89 KG/M2 | OXYGEN SATURATION: 92 % | HEART RATE: 84 BPM | RESPIRATION RATE: 20 BRPM | DIASTOLIC BLOOD PRESSURE: 58 MMHG | HEIGHT: 68 IN | WEIGHT: 217 LBS | SYSTOLIC BLOOD PRESSURE: 112 MMHG | TEMPERATURE: 99.1 F

## 2019-06-07 PROCEDURE — 36415 COLL VENOUS BLD VENIPUNCTURE: CPT

## 2019-06-07 PROCEDURE — 99214 OFFICE O/P EST MOD 30 MIN: CPT | Mod: 25

## 2019-06-07 NOTE — END OF VISIT
[FreeTextEntry3] : All medical record entries made by the Scribe were at my, Dr. Mejias's, direction and personally dictated by me on [6/7/2019]. I have reviewed the chart and agree that the record accurately reflects my personal performance of the history, physical exam, assessment and plan. I have also personally directed, reviewed and agreed with the chart.

## 2019-06-07 NOTE — ADDENDUM
[FreeTextEntry1] : I, Sulma Dempsey, acted solely as a scribe for Dr. Mejias on this date [6/7/2019].

## 2019-06-07 NOTE — HISTORY OF PRESENT ILLNESS
[de-identified] : Patient presents for follow up of hypertension, hyperlipidemia, CAD, IPF, prediabetes, prostate cancer. He is on amlodipine for hypertension, zocor for hyperlipidemia, ASA/plavix for CAD. He is on Esbriet for IPF and uses CPAP with O2 at night. He has portable O2, uses rarely as needed. His lung function is stable, MORALEZ at baseline. He has been treated for prostate cancer, completed RT, now on flomax, finasteride. Managing sugar with diet, 30lb weight loss in past year.\par \par Complains of intermittent dull left lower quadrant ache for the past 4 months, worse with lying down. No change in bowel habits. He denies nausea and vomiting. The pain has improved although still at the top of left hip. He was concerned for hernia, abdominal US was normal.

## 2019-06-07 NOTE — PHYSICAL EXAM
[No Acute Distress] : no acute distress [Well Developed] : well developed [Well-Appearing] : well-appearing [Well Nourished] : well nourished [No Accessory Muscle Use] : no accessory muscle use [No Respiratory Distress] : no respiratory distress  [Normal S1, S2] : normal S1 and S2 [Regular Rhythm] : with a regular rhythm [Normal Rate] : normal rate  [No Murmur] : no murmur heard [Normal Affect] : the affect was normal [Alert and Oriented x3] : oriented to person, place, and time [Normal Insight/Judgement] : insight and judgment were intact [No JVD] : no jugular venous distention [de-identified] : crackles with scattered wheeze at bases

## 2019-06-07 NOTE — REVIEW OF SYSTEMS
[Abdominal Pain] : abdominal pain [Negative] : Integumentary [Hearing Loss] : hearing loss [Dyspnea on Exertion] : dyspnea on exertion [Shortness Of Breath] : shortness of breath

## 2019-06-07 NOTE — ASSESSMENT
[FreeTextEntry1] : BP controlled.\par Recently saw cardiology, had new RBBB. Workup pending including carotid US.\par Continue current medications as directed. \par Advised low carb diet, continued weight loss. \par Labs drawn in office. Further recommendations based on labs. \par LLQ pain improved, negative US, no further workup needed now.\par He is current with cardiology, pulmonology, urology, rad onc.\par UTD on pneumovax, prevnar, flu vaccines. Deferred Tdap.\par Follow up in 6 months.

## 2019-06-10 LAB
ALBUMIN SERPL ELPH-MCNC: 4.2 G/DL
ALP BLD-CCNC: 58 U/L
ALT SERPL-CCNC: 15 U/L
ANION GAP SERPL CALC-SCNC: 13 MMOL/L
AST SERPL-CCNC: 29 U/L
BASOPHILS # BLD AUTO: 0.03 K/UL
BASOPHILS NFR BLD AUTO: 0.4 %
BILIRUB SERPL-MCNC: 0.4 MG/DL
BUN SERPL-MCNC: 21 MG/DL
CALCIUM SERPL-MCNC: 9.7 MG/DL
CHLORIDE SERPL-SCNC: 104 MMOL/L
CHOLEST SERPL-MCNC: 146 MG/DL
CHOLEST/HDLC SERPL: 3 RATIO
CO2 SERPL-SCNC: 26 MMOL/L
CREAT SERPL-MCNC: 1.24 MG/DL
EOSINOPHIL # BLD AUTO: 0.31 K/UL
EOSINOPHIL NFR BLD AUTO: 4.6 %
ESTIMATED AVERAGE GLUCOSE: 128 MG/DL
GLUCOSE SERPL-MCNC: 108 MG/DL
HBA1C MFR BLD HPLC: 6.1 %
HCT VFR BLD CALC: 44.8 %
HDLC SERPL-MCNC: 49 MG/DL
HGB BLD-MCNC: 13.6 G/DL
IMM GRANULOCYTES NFR BLD AUTO: 0.3 %
LDLC SERPL CALC-MCNC: 80 MG/DL
LYMPHOCYTES # BLD AUTO: 1.12 K/UL
LYMPHOCYTES NFR BLD AUTO: 16.7 %
MAN DIFF?: NORMAL
MCHC RBC-ENTMCNC: 28.8 PG
MCHC RBC-ENTMCNC: 30.4 GM/DL
MCV RBC AUTO: 94.9 FL
MONOCYTES # BLD AUTO: 0.62 K/UL
MONOCYTES NFR BLD AUTO: 9.2 %
NEUTROPHILS # BLD AUTO: 4.61 K/UL
NEUTROPHILS NFR BLD AUTO: 68.8 %
PLATELET # BLD AUTO: 206 K/UL
POTASSIUM SERPL-SCNC: 4.4 MMOL/L
PROT SERPL-MCNC: 7.5 G/DL
PSA SERPL-MCNC: 0.22 NG/ML
RBC # BLD: 4.72 M/UL
RBC # FLD: 14.9 %
SODIUM SERPL-SCNC: 142 MMOL/L
TRIGL SERPL-MCNC: 86 MG/DL
WBC # FLD AUTO: 6.71 K/UL

## 2019-07-15 ENCOUNTER — RX RENEWAL (OUTPATIENT)
Age: 79
End: 2019-07-15

## 2019-07-19 ENCOUNTER — MEDICATION RENEWAL (OUTPATIENT)
Age: 79
End: 2019-07-19

## 2019-07-24 ENCOUNTER — RX RENEWAL (OUTPATIENT)
Age: 79
End: 2019-07-24

## 2019-08-15 ENCOUNTER — MEDICATION RENEWAL (OUTPATIENT)
Age: 79
End: 2019-08-15

## 2019-08-26 ENCOUNTER — TRANSCRIPTION ENCOUNTER (OUTPATIENT)
Age: 79
End: 2019-08-26

## 2019-08-27 ENCOUNTER — APPOINTMENT (OUTPATIENT)
Age: 79
End: 2019-08-27
Payer: MEDICARE

## 2019-08-27 VITALS
HEIGHT: 67.5 IN | WEIGHT: 212 LBS | HEART RATE: 83 BPM | SYSTOLIC BLOOD PRESSURE: 136 MMHG | OXYGEN SATURATION: 86 % | DIASTOLIC BLOOD PRESSURE: 76 MMHG | BODY MASS INDEX: 32.89 KG/M2

## 2019-08-27 PROCEDURE — 99214 OFFICE O/P EST MOD 30 MIN: CPT

## 2019-08-27 NOTE — ASSESSMENT
[FreeTextEntry1] : #1) radiation cystitis: the patient ran out of the oxybutynin ER 10 mg once again and still does not realize that he has refills. He prefers to discontinue this medication to see how he does.\par RTO 2 months for reevaluation as to whether or not to resume the oxybutynin ER 10 mg daily.\par \par #2) BPH: Continue tamsulosin one capsule and finasteride- refills needed.\par \par #3) Prostate cancer: Status post radiation completed on 6/22/18 at [facility]\par 12/18/18 PSA 0.64\par 6/7/19 PSA 0.22\par PSA in 6 months\par \par He'll return in 6 months for reevaluation.

## 2019-08-27 NOTE — PHYSICAL EXAM
[General Appearance - Well Developed] : well developed [General Appearance - Well Nourished] : well nourished [Normal Appearance] : normal appearance [Well Groomed] : well groomed [General Appearance - In No Acute Distress] : no acute distress [Abdomen Soft] : soft [Bowel Sounds] : normal bowel sounds [Abdomen Tenderness] : non-tender [Abdomen Mass (___ Cm)] : no abdominal mass palpated [Costovertebral Angle Tenderness] : no ~M costovertebral angle tenderness [] : no rash [Edema] : no peripheral edema [Oriented To Time, Place, And Person] : oriented to person, place, and time [Affect] : the affect was normal [Mood] : the mood was normal [Not Anxious] : not anxious [Normal Station and Gait] : the gait and station were normal for the patient's age [No Focal Deficits] : no focal deficits

## 2019-08-27 NOTE — HISTORY OF PRESENT ILLNESS
[FreeTextEntry1] : The patient is a 77-year-old gentleman who was seen in the office today for the above. He is presently on tamsulosin one capsule, and finasteride for BPH. He is also on oxybutynin ER 10 mg daily for symptoms of radiation cystitis. He discontinued the Pyridium because it bothered him.\par His daytime frequency has decreased to 4 times a day, his nocturia has decreased to twice a night, the urgency and urge incontinence have resolved. Unfortunately, he ran out of the oxybutynin ER 10 mg 2 months ago and was not aware of having to renew it so the urgency and urge incontinence recurred but not as severe as in the past and has been tolerable. . His incontinence happens about twice a week. He is not using pads and does not to change his underclothes. However, he still has dysuria but it is tolerable.\par \par His past medical history, surgical history, medications history and allergy history are unchanged.

## 2019-09-05 ENCOUNTER — MEDICATION RENEWAL (OUTPATIENT)
Age: 79
End: 2019-09-05

## 2019-09-13 ENCOUNTER — APPOINTMENT (OUTPATIENT)
Dept: PULMONOLOGY | Facility: CLINIC | Age: 79
End: 2019-09-13
Payer: MEDICARE

## 2019-09-13 VITALS — OXYGEN SATURATION: 91 % | HEART RATE: 71 BPM

## 2019-09-13 VITALS — WEIGHT: 216 LBS | BODY MASS INDEX: 33.33 KG/M2

## 2019-09-13 VITALS — OXYGEN SATURATION: 93 %

## 2019-09-13 VITALS — DIASTOLIC BLOOD PRESSURE: 60 MMHG | SYSTOLIC BLOOD PRESSURE: 130 MMHG

## 2019-09-13 PROCEDURE — 94729 DIFFUSING CAPACITY: CPT

## 2019-09-13 PROCEDURE — 99215 OFFICE O/P EST HI 40 MIN: CPT | Mod: 25

## 2019-09-13 PROCEDURE — 94727 GAS DIL/WSHOT DETER LNG VOL: CPT

## 2019-09-13 PROCEDURE — 85018 HEMOGLOBIN: CPT | Mod: QW

## 2019-09-13 PROCEDURE — 94010 BREATHING CAPACITY TEST: CPT

## 2019-09-13 NOTE — PHYSICAL EXAM
[General Appearance - Well Developed] : well developed [Normal Appearance] : normal appearance [General Appearance - Well Nourished] : well nourished [Heart Rate And Rhythm] : heart rate and rhythm were normal [Heart Sounds] : normal S1 and S2 [Murmurs] : no murmurs present [Edema] : no peripheral edema present [Exaggerated Use Of Accessory Muscles For Inspiration] : no accessory muscle use [Respiration, Rhythm And Depth] : normal respiratory rhythm and effort [Abnormal Walk] : normal gait [Nail Clubbing] : no clubbing of the fingernails [Cyanosis, Localized] : no localized cyanosis [] : no rash [No Focal Deficits] : no focal deficits [Oriented To Time, Place, And Person] : oriented to person, place, and time [Impaired Insight] : insight and judgment were intact [Memory Recent] : recent memory was not impaired [Affect] : the affect was normal [FreeTextEntry1] : no chest wall abn

## 2019-09-13 NOTE — DISCUSSION/SUMMARY
[FreeTextEntry1] :  Interstitial lung disease probable IPF, \par No change by previous  CT scan 7/19\par PFTs: slight improvement TLC and DLCO\par discussed 02 use, he will check his pulse oximeter daily, uses nocturnal with cpap\par Underlying obstructive sleep apnea on CPAP 12, with 02 2 Liters , want to stop using mask- weight l;oss noted , will check home study on 02 alone\par tolerating  pirfenidone risks/benefits discussed, pt tolerating well, Lfts have been stable, followed by PMD\par taking 2/1/1 pill s, will continue currently as he is doing well\par Followed by Cardiology, last echo 1/19, no sig pulmonary Htn, RV normal\par Reevaluate in 4-6 months, vaccinations this fall \par \par \par

## 2019-09-13 NOTE — CONSULT LETTER
[Dear  ___] : Dear  [unfilled], [Please see my note below.] : Please see my note below. [Consult Letter:] : I had the pleasure of evaluating your patient, [unfilled]. [Consult Closing:] : Thank you very much for allowing me to participate in the care of this patient.  If you have any questions, please do not hesitate to contact me. [Sincerely,] : Sincerely, [Kalpesh Padilla DO, [facility]] : Kalpesh Padilla DO, [facility] [Director, Respiratory Care] : Director, Respiratory Care [[facility]] : [facility] [FreeTextEntry3] : Kal[facility] DO [facility]\par Pulmonary Critical Care\par Director Pulmonary Division\par Medical Director Respiratory Therapy\par [facility]\par \par

## 2019-10-07 ENCOUNTER — MEDICATION RENEWAL (OUTPATIENT)
Age: 79
End: 2019-10-07

## 2019-10-22 ENCOUNTER — APPOINTMENT (OUTPATIENT)
Dept: UROLOGY | Facility: CLINIC | Age: 79
End: 2019-10-22
Payer: MEDICARE

## 2019-10-22 VITALS
BODY MASS INDEX: 33.9 KG/M2 | DIASTOLIC BLOOD PRESSURE: 65 MMHG | WEIGHT: 216 LBS | OXYGEN SATURATION: 92 % | HEIGHT: 67 IN | HEART RATE: 104 BPM | SYSTOLIC BLOOD PRESSURE: 110 MMHG | RESPIRATION RATE: 22 BRPM

## 2019-10-22 DIAGNOSIS — N30.40 IRRADIATION CYSTITIS W/OUT HEMATURIA: ICD-10-CM

## 2019-10-22 DIAGNOSIS — N40.0 BENIGN PROSTATIC HYPERPLASIA WITHOUT LOWER URINARY TRACT SYMPMS: ICD-10-CM

## 2019-10-22 PROCEDURE — 99214 OFFICE O/P EST MOD 30 MIN: CPT

## 2019-10-22 NOTE — ASSESSMENT
[FreeTextEntry1] : #1) Urinary retention: Due to oxybutynin ER-patient was instructed to stop the medication and to return Thursday, 10/24/19 for a TOV.\par \par #2) radiation cystitis: Hold oxybutynin ER 10 mg; \par RTO 2 months for reevaluation as to whether or not to resume the oxybutynin ER 10 mg daily.\par \par #2) BPH: Continue tamsulosin one capsule and finasteride- refills needed.\par \par #3) Prostate cancer: Status post radiation completed on 6/22/18 at [facility]\par 12/18/18 PSA 0.64\par 6/7/19 PSA 0.22\par PSA in 6 months\par \par He'll return in 6 months for reevaluation.

## 2019-10-22 NOTE — PHYSICAL EXAM
[General Appearance - Well Developed] : well developed [General Appearance - Well Nourished] : well nourished [Normal Appearance] : normal appearance [Well Groomed] : well groomed [General Appearance - In No Acute Distress] : no acute distress [Bowel Sounds] : normal bowel sounds [Abdomen Soft] : soft [Abdomen Tenderness] : non-tender [Abdomen Mass (___ Cm)] : no abdominal mass palpated [Costovertebral Angle Tenderness] : no ~M costovertebral angle tenderness [Urethral Meatus] : meatus normal [Penis Abnormality] : normal circumcised penis [] : no rash [Edema] : no peripheral edema [Oriented To Time, Place, And Person] : oriented to person, place, and time [Affect] : the affect was normal [Mood] : the mood was normal [Not Anxious] : not anxious [Normal Station and Gait] : the gait and station were normal for the patient's age [No Focal Deficits] : no focal deficits [FreeTextEntry1] : A 20 [place] two-way Barajas catheter is present and there is crusted mucus on the catheterclose to the meatus and further distally.The urine in the leg bag is yellow and clear.

## 2019-10-22 NOTE — HISTORY OF PRESENT ILLNESS
[FreeTextEntry1] : The patient is a 78-year-old gentleman who was seen with his wife the office today for the above. He is on tamsulosin one capsule and finasteride for BPH and oxybutynin ER 10 mg daily for radiation cystitis. At the last visit, he stated that he wanted to hold the oxybutynin to see if he can do without it. He reported that his symptoms of frequency and urgency recurred so he restarted it.\par He also reported that he was in [place] on Saturday, 10/19/19 at a function when he was unable to urinate. He went to the emergency room and a Barajas catheter was inserted. He was discharged on Cipro. He still has the catheter. He was not told to discontinue the oxybutynin. His last dose was this morning. Prior to this episode, his daytime frequency was 6 times a day with nocturia x2 and his urinary burning has continued intermittently from the radiation cystitis.\par \par His past medical history, surgical history, medication history and allergy history are unchanged.

## 2019-10-24 ENCOUNTER — APPOINTMENT (OUTPATIENT)
Dept: UROLOGY | Facility: CLINIC | Age: 79
End: 2019-10-24
Payer: MEDICARE

## 2019-10-24 ENCOUNTER — OUTPATIENT (OUTPATIENT)
Dept: OUTPATIENT SERVICES | Facility: HOSPITAL | Age: 79
LOS: 1 days | End: 2019-10-24
Payer: MEDICARE

## 2019-10-24 DIAGNOSIS — G47.33 OBSTRUCTIVE SLEEP APNEA (ADULT) (PEDIATRIC): ICD-10-CM

## 2019-10-24 PROCEDURE — G0399: CPT

## 2019-10-24 PROCEDURE — 95806 SLEEP STUDY UNATT&RESP EFFT: CPT

## 2019-10-24 PROCEDURE — 99213 OFFICE O/P EST LOW 20 MIN: CPT | Mod: 25

## 2019-10-24 PROCEDURE — 51702 INSERT TEMP BLADDER CATH: CPT

## 2019-10-24 PROCEDURE — 95806 SLEEP STUDY UNATT&RESP EFFT: CPT | Mod: 26

## 2019-11-11 ENCOUNTER — MEDICATION RENEWAL (OUTPATIENT)
Age: 79
End: 2019-11-11

## 2019-11-12 ENCOUNTER — APPOINTMENT (OUTPATIENT)
Dept: CARDIOLOGY | Facility: CLINIC | Age: 79
End: 2019-11-12
Payer: MEDICARE

## 2019-11-12 PROCEDURE — 93880 EXTRACRANIAL BILAT STUDY: CPT

## 2019-11-18 ENCOUNTER — RX RENEWAL (OUTPATIENT)
Age: 79
End: 2019-11-18

## 2019-11-21 ENCOUNTER — NON-APPOINTMENT (OUTPATIENT)
Age: 79
End: 2019-11-21

## 2019-11-21 ENCOUNTER — APPOINTMENT (OUTPATIENT)
Dept: CARDIOLOGY | Facility: CLINIC | Age: 79
End: 2019-11-21
Payer: MEDICARE

## 2019-11-21 VITALS
DIASTOLIC BLOOD PRESSURE: 72 MMHG | WEIGHT: 212 LBS | BODY MASS INDEX: 33.27 KG/M2 | HEART RATE: 89 BPM | SYSTOLIC BLOOD PRESSURE: 131 MMHG | HEIGHT: 67 IN | OXYGEN SATURATION: 92 %

## 2019-11-21 DIAGNOSIS — R94.31 ABNORMAL ELECTROCARDIOGRAM [ECG] [EKG]: ICD-10-CM

## 2019-11-21 PROCEDURE — 93000 ELECTROCARDIOGRAM COMPLETE: CPT

## 2019-11-21 PROCEDURE — 99215 OFFICE O/P EST HI 40 MIN: CPT

## 2019-12-02 ENCOUNTER — APPOINTMENT (OUTPATIENT)
Dept: PULMONOLOGY | Facility: CLINIC | Age: 79
End: 2019-12-02

## 2019-12-06 ENCOUNTER — TRANSCRIPTION ENCOUNTER (OUTPATIENT)
Age: 79
End: 2019-12-06

## 2019-12-09 ENCOUNTER — TRANSCRIPTION ENCOUNTER (OUTPATIENT)
Age: 79
End: 2019-12-09

## 2019-12-12 ENCOUNTER — APPOINTMENT (OUTPATIENT)
Dept: PULMONOLOGY | Facility: CLINIC | Age: 79
End: 2019-12-12
Payer: MEDICARE

## 2019-12-12 VITALS — BODY MASS INDEX: 33.2 KG/M2 | WEIGHT: 212 LBS

## 2019-12-12 VITALS — OXYGEN SATURATION: 92 % | SYSTOLIC BLOOD PRESSURE: 130 MMHG | DIASTOLIC BLOOD PRESSURE: 80 MMHG | HEART RATE: 97 BPM

## 2019-12-12 PROCEDURE — 99214 OFFICE O/P EST MOD 30 MIN: CPT

## 2019-12-12 NOTE — HISTORY OF PRESENT ILLNESS
[FreeTextEntry1] : doing well, no new pulmonary complaints\par dyspnea at baseline\par  using cpap with 02 2L, nocturnal\par not using portable\par no fever, chills or chest pain\par no sig sputum\par remains on esbriet, \par \par Lost weight since diagnosis of LILLY in 2013 (243-216 lbs)\par Feels cpap pressure is too high \par Has S10 in [place] and F&P CPAP machine in [place]

## 2019-12-13 ENCOUNTER — APPOINTMENT (OUTPATIENT)
Dept: INTERNAL MEDICINE | Facility: CLINIC | Age: 79
End: 2019-12-13

## 2019-12-20 ENCOUNTER — APPOINTMENT (OUTPATIENT)
Dept: INTERNAL MEDICINE | Facility: CLINIC | Age: 79
End: 2019-12-20
Payer: MEDICARE

## 2019-12-20 VITALS
SYSTOLIC BLOOD PRESSURE: 128 MMHG | RESPIRATION RATE: 16 BRPM | DIASTOLIC BLOOD PRESSURE: 70 MMHG | HEART RATE: 71 BPM | BODY MASS INDEX: 33.19 KG/M2 | OXYGEN SATURATION: 90 % | WEIGHT: 211.5 LBS | HEIGHT: 67 IN

## 2019-12-20 DIAGNOSIS — Z87.39 PERSONAL HISTORY OF OTHER DISEASES OF THE MUSCULOSKELETAL SYSTEM AND CONNECTIVE TISSUE: ICD-10-CM

## 2019-12-20 DIAGNOSIS — Z87.09 PERSONAL HISTORY OF OTHER DISEASES OF THE RESPIRATORY SYSTEM: ICD-10-CM

## 2019-12-20 DIAGNOSIS — R10.32 LEFT LOWER QUADRANT PAIN: ICD-10-CM

## 2019-12-20 DIAGNOSIS — Z86.39 PERSONAL HISTORY OF OTHER ENDOCRINE, NUTRITIONAL AND METABOLIC DISEASE: ICD-10-CM

## 2019-12-20 DIAGNOSIS — Z87.898 PERSONAL HISTORY OF OTHER SPECIFIED CONDITIONS: ICD-10-CM

## 2019-12-20 DIAGNOSIS — S83.206A UNSPECIFIED TEAR OF UNSPECIFIED MENISCUS, CURRENT INJURY, RIGHT KNEE, INITIAL ENCOUNTER: ICD-10-CM

## 2019-12-20 PROCEDURE — 36415 COLL VENOUS BLD VENIPUNCTURE: CPT

## 2019-12-20 PROCEDURE — 99214 OFFICE O/P EST MOD 30 MIN: CPT | Mod: 25

## 2019-12-20 RX ORDER — FLUTICASONE PROPIONATE 50 UG/1
50 SPRAY, METERED NASAL DAILY
Refills: 0 | Status: DISCONTINUED | COMMUNITY
End: 2019-12-20

## 2019-12-20 RX ORDER — CEFUROXIME AXETIL 500 MG/1
500 TABLET ORAL
Qty: 10 | Refills: 3 | Status: DISCONTINUED | COMMUNITY
Start: 2019-09-13 | End: 2019-12-20

## 2019-12-20 RX ORDER — OXYBUTYNIN CHLORIDE 10 MG/1
10 TABLET, EXTENDED RELEASE ORAL DAILY
Qty: 60 | Refills: 1 | Status: DISCONTINUED | COMMUNITY
Start: 2018-07-17 | End: 2019-12-20

## 2019-12-20 NOTE — PHYSICAL EXAM
[No Acute Distress] : no acute distress [Well Nourished] : well nourished [Well Developed] : well developed [Normal Sclera/Conjunctiva] : normal sclera/conjunctiva [Well-Appearing] : well-appearing [PERRL] : pupils equal round and reactive to light [EOMI] : extraocular movements intact [Normal Outer Ear/Nose] : the outer ears and nose were normal in appearance [Normal Oropharynx] : the oropharynx was normal [No JVD] : no jugular venous distention [No Lymphadenopathy] : no lymphadenopathy [Supple] : supple [Thyroid Normal, No Nodules] : the thyroid was normal and there were no nodules present [No Respiratory Distress] : no respiratory distress  [No Accessory Muscle Use] : no accessory muscle use [Normal Rate] : normal rate  [Clear to Auscultation] : lungs were clear to auscultation bilaterally [Regular Rhythm] : with a regular rhythm [Normal S1, S2] : normal S1 and S2 [No Murmur] : no murmur heard [No Carotid Bruits] : no carotid bruits [No Abdominal Bruit] : a ~M bruit was not heard ~T in the abdomen [No Varicosities] : no varicosities [Pedal Pulses Present] : the pedal pulses are present [No Edema] : there was no peripheral edema [No Palpable Aorta] : no palpable aorta [No Extremity Clubbing/Cyanosis] : no extremity clubbing/cyanosis [Soft] : abdomen soft [Non Tender] : non-tender [Non-distended] : non-distended [No Masses] : no abdominal mass palpated [No HSM] : no HSM [Normal Bowel Sounds] : normal bowel sounds [Normal Posterior Cervical Nodes] : no posterior cervical lymphadenopathy [Normal Anterior Cervical Nodes] : no anterior cervical lymphadenopathy [No CVA Tenderness] : no CVA  tenderness [No Spinal Tenderness] : no spinal tenderness [No Joint Swelling] : no joint swelling [Grossly Normal Strength/Tone] : grossly normal strength/tone [No Rash] : no rash [Coordination Grossly Intact] : coordination grossly intact [No Focal Deficits] : no focal deficits [Normal Gait] : normal gait [Deep Tendon Reflexes (DTR)] : deep tendon reflexes were 2+ and symmetric [Normal Affect] : the affect was normal [Normal Insight/Judgement] : insight and judgment were intact

## 2019-12-21 LAB
ALBUMIN SERPL ELPH-MCNC: 4.1 G/DL
ALP BLD-CCNC: 55 U/L
ALT SERPL-CCNC: 14 U/L
ANION GAP SERPL CALC-SCNC: 10 MMOL/L
AST SERPL-CCNC: 34 U/L
BASOPHILS # BLD AUTO: 0.05 K/UL
BASOPHILS NFR BLD AUTO: 0.7 %
BILIRUB SERPL-MCNC: 0.4 MG/DL
BUN SERPL-MCNC: 19 MG/DL
CALCIUM SERPL-MCNC: 9.4 MG/DL
CHLORIDE SERPL-SCNC: 102 MMOL/L
CO2 SERPL-SCNC: 28 MMOL/L
CREAT SERPL-MCNC: 1.15 MG/DL
EOSINOPHIL # BLD AUTO: 0.43 K/UL
EOSINOPHIL NFR BLD AUTO: 6 %
ESTIMATED AVERAGE GLUCOSE: 123 MG/DL
GLUCOSE SERPL-MCNC: 111 MG/DL
HBA1C MFR BLD HPLC: 5.9 %
HCT VFR BLD CALC: 45.2 %
HGB BLD-MCNC: 14.6 G/DL
IMM GRANULOCYTES NFR BLD AUTO: 0.3 %
LYMPHOCYTES # BLD AUTO: 1.03 K/UL
LYMPHOCYTES NFR BLD AUTO: 14.4 %
MAN DIFF?: NORMAL
MCHC RBC-ENTMCNC: 30.4 PG
MCHC RBC-ENTMCNC: 32.3 GM/DL
MCV RBC AUTO: 94 FL
MONOCYTES # BLD AUTO: 0.59 K/UL
MONOCYTES NFR BLD AUTO: 8.3 %
NEUTROPHILS # BLD AUTO: 5.02 K/UL
NEUTROPHILS NFR BLD AUTO: 70.3 %
PLATELET # BLD AUTO: 207 K/UL
POTASSIUM SERPL-SCNC: 4.7 MMOL/L
PROT SERPL-MCNC: 7.4 G/DL
PSA SERPL-MCNC: 0.2 NG/ML
RBC # BLD: 4.81 M/UL
RBC # FLD: 14.6 %
SODIUM SERPL-SCNC: 140 MMOL/L
WBC # FLD AUTO: 7.14 K/UL

## 2019-12-22 NOTE — HISTORY OF PRESENT ILLNESS
[FreeTextEntry1] : Follow up HTN [de-identified] : -PMH: HTN, CAD, BPH, Anxiety, Pulm Fibrosis (Dx 2016), Prostate CA, LILLY\par -SH: Retired. . 4 children. 8 grandchildren. Former Smoker (Quit 1963)\par \par THEODORA is a 79 year M whom is here today for follow up HTN\par Today, pt reports feeling well and is w/o complaints. \par \par Follows with the following Physicians: \par -Cardio: Dr. Qureshi\par -Pulm: Dr. Padilla\par -Uro: Dr. Ocampo \par \par -HTN: Currently on Amlodipine 5mg. Follows with cardio (Last seen 11/21) and was noted to have a new RBBB. Was advised to f/u in 1 year. \par -BPH/Prostate CA: Dx 2/2018. S/p radiation. Currently on Finasteride * Tamsulosin. Reports symptoms well controlled. Follows w/ Uro. Last seen 10/2019 and was advised to return in 6mo.\par -Anxiety: Currently on Alprazolam 0.5mg in the AM and occasionally again in the PM. \par -Pulm Fibrosis: Dx 2016. Uses Home O2 QHS. Follows closely with Pulm. Condition stable. Reports SOBOE

## 2019-12-22 NOTE — ADDENDUM
[FreeTextEntry1] : PSA: 0.20\par CBC: WNL\par CMP: WNL (Except mildly elevated glucose)\par A1c: 5.9 (Down from 6.1)\par \par All is good with no new recs at this time. Will repeat labs in 6mo. Will send for DEXA at that time.

## 2019-12-23 ENCOUNTER — RESULT REVIEW (OUTPATIENT)
Age: 79
End: 2019-12-23

## 2019-12-23 ENCOUNTER — TRANSCRIPTION ENCOUNTER (OUTPATIENT)
Age: 79
End: 2019-12-23

## 2020-01-01 ENCOUNTER — RX RENEWAL (OUTPATIENT)
Age: 80
End: 2020-01-01

## 2020-01-01 ENCOUNTER — APPOINTMENT (OUTPATIENT)
Dept: PULMONOLOGY | Facility: CLINIC | Age: 80
End: 2020-01-01
Payer: MEDICARE

## 2020-01-01 ENCOUNTER — APPOINTMENT (OUTPATIENT)
Dept: CARDIOLOGY | Facility: CLINIC | Age: 80
End: 2020-01-01
Payer: MEDICARE

## 2020-01-01 ENCOUNTER — APPOINTMENT (OUTPATIENT)
Dept: INTERNAL MEDICINE | Facility: CLINIC | Age: 80
End: 2020-01-01
Payer: MEDICARE

## 2020-01-01 ENCOUNTER — APPOINTMENT (OUTPATIENT)
Dept: PULMONOLOGY | Facility: CLINIC | Age: 80
End: 2020-01-01

## 2020-01-01 ENCOUNTER — NON-APPOINTMENT (OUTPATIENT)
Age: 80
End: 2020-01-01

## 2020-01-01 ENCOUNTER — APPOINTMENT (OUTPATIENT)
Dept: DISASTER EMERGENCY | Facility: CLINIC | Age: 80
End: 2020-01-01

## 2020-01-01 VITALS
TEMPERATURE: 97.6 F | RESPIRATION RATE: 18 BRPM | DIASTOLIC BLOOD PRESSURE: 78 MMHG | OXYGEN SATURATION: 87 % | HEIGHT: 67 IN | BODY MASS INDEX: 32.02 KG/M2 | WEIGHT: 204 LBS | HEART RATE: 86 BPM | SYSTOLIC BLOOD PRESSURE: 134 MMHG

## 2020-01-01 VITALS
DIASTOLIC BLOOD PRESSURE: 70 MMHG | OXYGEN SATURATION: 82 % | SYSTOLIC BLOOD PRESSURE: 120 MMHG | HEART RATE: 84 BPM | RESPIRATION RATE: 16 BRPM

## 2020-01-01 VITALS
OXYGEN SATURATION: 95 % | BODY MASS INDEX: 33.59 KG/M2 | HEIGHT: 67 IN | WEIGHT: 214 LBS | DIASTOLIC BLOOD PRESSURE: 60 MMHG | TEMPERATURE: 97.1 F | SYSTOLIC BLOOD PRESSURE: 114 MMHG | HEART RATE: 80 BPM

## 2020-01-01 VITALS
SYSTOLIC BLOOD PRESSURE: 140 MMHG | DIASTOLIC BLOOD PRESSURE: 74 MMHG | WEIGHT: 204.25 LBS | HEART RATE: 77 BPM | RESPIRATION RATE: 16 BRPM | BODY MASS INDEX: 32.06 KG/M2 | HEIGHT: 67 IN | OXYGEN SATURATION: 85 % | TEMPERATURE: 97.3 F

## 2020-01-01 VITALS — OXYGEN SATURATION: 88 %

## 2020-01-01 VITALS — TEMPERATURE: 97.4 F

## 2020-01-01 DIAGNOSIS — R09.02 HYPOXEMIA: ICD-10-CM

## 2020-01-01 DIAGNOSIS — Z92.29 PERSONAL HISTORY OF OTHER DRUG THERAPY: ICD-10-CM

## 2020-01-01 DIAGNOSIS — Z01.818 ENCOUNTER FOR OTHER PREPROCEDURAL EXAMINATION: ICD-10-CM

## 2020-01-01 DIAGNOSIS — L03.811 CELLULITIS OF HEAD [ANY PART, EXCEPT FACE]: ICD-10-CM

## 2020-01-01 DIAGNOSIS — Z20.828 CONTACT WITH AND (SUSPECTED) EXPOSURE TO OTHER VIRAL COMMUNICABLE DISEASES: ICD-10-CM

## 2020-01-01 LAB
ALBUMIN SERPL ELPH-MCNC: 4.1 G/DL
ALBUMIN SERPL ELPH-MCNC: 4.2 G/DL
ALP BLD-CCNC: 58 U/L
ALP BLD-CCNC: 72 U/L
ALT SERPL-CCNC: 22 U/L
ALT SERPL-CCNC: 44 U/L
ANION GAP SERPL CALC-SCNC: 10 MMOL/L
ANION GAP SERPL CALC-SCNC: 13 MMOL/L
AST SERPL-CCNC: 40 U/L
AST SERPL-CCNC: 54 U/L
BASOPHILS # BLD AUTO: 0.05 K/UL
BASOPHILS NFR BLD AUTO: 0.8 %
BILIRUB SERPL-MCNC: 0.5 MG/DL
BILIRUB SERPL-MCNC: 0.5 MG/DL
BUN SERPL-MCNC: 19 MG/DL
BUN SERPL-MCNC: 20 MG/DL
CALCIUM SERPL-MCNC: 10 MG/DL
CALCIUM SERPL-MCNC: 9.6 MG/DL
CHLORIDE SERPL-SCNC: 103 MMOL/L
CHLORIDE SERPL-SCNC: 98 MMOL/L
CHOLEST SERPL-MCNC: 136 MG/DL
CO2 SERPL-SCNC: 27 MMOL/L
CO2 SERPL-SCNC: 30 MMOL/L
CREAT SERPL-MCNC: 1.31 MG/DL
CREAT SERPL-MCNC: 1.44 MG/DL
EOSINOPHIL # BLD AUTO: 0.5 K/UL
EOSINOPHIL NFR BLD AUTO: 8.1 %
ESTIMATED AVERAGE GLUCOSE: 126 MG/DL
GLUCOSE SERPL-MCNC: 103 MG/DL
GLUCOSE SERPL-MCNC: 104 MG/DL
HBA1C MFR BLD HPLC: 6 %
HCT VFR BLD CALC: 52 %
HDLC SERPL-MCNC: 57 MG/DL
HGB BLD-MCNC: 16.5 G/DL
IMM GRANULOCYTES NFR BLD AUTO: 0.2 %
LDLC SERPL CALC-MCNC: 63 MG/DL
LYMPHOCYTES # BLD AUTO: 0.95 K/UL
LYMPHOCYTES NFR BLD AUTO: 15.3 %
MAN DIFF?: NORMAL
MCHC RBC-ENTMCNC: 30.7 PG
MCHC RBC-ENTMCNC: 31.7 GM/DL
MCV RBC AUTO: 96.7 FL
MONOCYTES # BLD AUTO: 0.55 K/UL
MONOCYTES NFR BLD AUTO: 8.9 %
NEUTROPHILS # BLD AUTO: 4.15 K/UL
NEUTROPHILS NFR BLD AUTO: 66.7 %
NONHDLC SERPL-MCNC: 78 MG/DL
PLATELET # BLD AUTO: 193 K/UL
POTASSIUM SERPL-SCNC: 4.6 MMOL/L
POTASSIUM SERPL-SCNC: 5.2 MMOL/L
PROT SERPL-MCNC: 7.2 G/DL
PROT SERPL-MCNC: 7.3 G/DL
RBC # BLD: 5.38 M/UL
RBC # FLD: 13.6 %
SARS-COV-2 N GENE NPH QL NAA+PROBE: NOT DETECTED
SODIUM SERPL-SCNC: 138 MMOL/L
SODIUM SERPL-SCNC: 143 MMOL/L
TRIGL SERPL-MCNC: 75 MG/DL
WBC # FLD AUTO: 6.21 K/UL

## 2020-01-01 PROCEDURE — 90662 IIV NO PRSV INCREASED AG IM: CPT

## 2020-01-01 PROCEDURE — 36415 COLL VENOUS BLD VENIPUNCTURE: CPT

## 2020-01-01 PROCEDURE — 94010 BREATHING CAPACITY TEST: CPT

## 2020-01-01 PROCEDURE — G0008: CPT

## 2020-01-01 PROCEDURE — 85018 HEMOGLOBIN: CPT | Mod: QW

## 2020-01-01 PROCEDURE — 94729 DIFFUSING CAPACITY: CPT

## 2020-01-01 PROCEDURE — 99215 OFFICE O/P EST HI 40 MIN: CPT | Mod: 25

## 2020-01-01 PROCEDURE — 99213 OFFICE O/P EST LOW 20 MIN: CPT | Mod: 25

## 2020-01-01 PROCEDURE — 94727 GAS DIL/WSHOT DETER LNG VOL: CPT

## 2020-01-01 PROCEDURE — G0439: CPT

## 2020-01-01 PROCEDURE — G0444 DEPRESSION SCREEN ANNUAL: CPT | Mod: 59

## 2020-01-01 PROCEDURE — 93880 EXTRACRANIAL BILAT STUDY: CPT

## 2020-01-01 PROCEDURE — 99214 OFFICE O/P EST MOD 30 MIN: CPT

## 2020-01-01 PROCEDURE — 93000 ELECTROCARDIOGRAM COMPLETE: CPT

## 2020-01-01 RX ORDER — PIRFENIDONE 267 MG/1
267 CAPSULE ORAL
Qty: 320 | Refills: 3 | Status: DISCONTINUED | COMMUNITY
Start: 2017-04-03 | End: 2020-01-01

## 2020-01-09 ENCOUNTER — MEDICATION RENEWAL (OUTPATIENT)
Age: 80
End: 2020-01-09

## 2020-01-21 ENCOUNTER — RX RENEWAL (OUTPATIENT)
Age: 80
End: 2020-01-21

## 2020-01-21 ENCOUNTER — APPOINTMENT (OUTPATIENT)
Dept: PULMONOLOGY | Facility: CLINIC | Age: 80
End: 2020-01-21
Payer: MEDICARE

## 2020-01-21 VITALS — WEIGHT: 212 LBS | BODY MASS INDEX: 33.2 KG/M2

## 2020-01-21 VITALS — DIASTOLIC BLOOD PRESSURE: 72 MMHG | SYSTOLIC BLOOD PRESSURE: 136 MMHG

## 2020-01-21 VITALS — OXYGEN SATURATION: 93 %

## 2020-01-21 PROCEDURE — 99214 OFFICE O/P EST MOD 30 MIN: CPT

## 2020-01-21 NOTE — HISTORY OF PRESENT ILLNESS
[FreeTextEntry1] : doing well, no new pulmonary complaints\par dyspnea at baseline\par  using cpap 9 with 02 2L, nocturnal\par not using portable\par no fever, chills or chest pain\par no sig sputum\par continue  esbriet,

## 2020-01-21 NOTE — PHYSICAL EXAM
[General Appearance - Well Developed] : well developed [Normal Appearance] : normal appearance [General Appearance - Well Nourished] : well nourished [Heart Rate And Rhythm] : heart rate and rhythm were normal [Murmurs] : no murmurs present [Heart Sounds] : normal S1 and S2 [Edema] : no peripheral edema present [Respiration, Rhythm And Depth] : normal respiratory rhythm and effort [Exaggerated Use Of Accessory Muscles For Inspiration] : no accessory muscle use [FreeTextEntry1] : no chest wall abn [Abnormal Walk] : normal gait [Nail Clubbing] : no clubbing of the fingernails [Cyanosis, Localized] : no localized cyanosis [] : no rash [No Focal Deficits] : no focal deficits [Oriented To Time, Place, And Person] : oriented to person, place, and time [Affect] : the affect was normal [Impaired Insight] : insight and judgment were intact [Memory Recent] : recent memory was not impaired

## 2020-01-21 NOTE — DISCUSSION/SUMMARY
[FreeTextEntry1] :  Interstitial lung disease probable IPF, \par No change by previous  CT scan 7/19\par PFTs: slight improvement TLC and DLCO\par discussed 02 use, he will check his pulse oximeter daily, uses nocturnal with cpap\par Underlying obstructive sleep apnea now  on CPAP 9, with 02 2 Liters \par tolerating  pirfenidone risks/benefits discussed, pt tolerating well, Lfts remain stable, followed by PMD\par taking 2/1/1 pill s, will continue currently as he is doing well\par Followed by Cardiology, last echo 1/19, no sig pulmonary Htn, RV normal\par Reevaluate in 4-6 months, vaccinations this fall \par \par \par

## 2020-01-21 NOTE — CONSULT LETTER
[Dear  ___] : Dear  [unfilled], [Consult Letter:] : I had the pleasure of evaluating your patient, [unfilled]. [Please see my note below.] : Please see my note below. [Consult Closing:] : Thank you very much for allowing me to participate in the care of this patient.  If you have any questions, please do not hesitate to contact me. [Sincerely,] : Sincerely, [FreeTextEntry3] : Kalpesh Padilla DO [facility]\par Pulmonary Critical Care\par Director Pulmonary Division\par Medical Director Respiratory Therapy\par [facility]\par \par  [[facility]] : [facility] [Kal[facility] Padilla DO, [facility]] : Kal[facility]esh Padilla DO, [facility] [Director, Respiratory Care] : Director, Respiratory Care

## 2020-01-21 NOTE — PROCEDURE
[FreeTextEntry1] : \par CT reviewed 7/19; no change coarse interstitial infiltrates\par PFts: severe restrictive impairment with severely impaired DLCO\par \par \par

## 2020-01-29 ENCOUNTER — APPOINTMENT (OUTPATIENT)
Dept: PULMONOLOGY | Facility: CLINIC | Age: 80
End: 2020-01-29
Payer: MEDICARE

## 2020-01-29 VITALS
RESPIRATION RATE: 18 BRPM | BODY MASS INDEX: 33.2 KG/M2 | SYSTOLIC BLOOD PRESSURE: 134 MMHG | DIASTOLIC BLOOD PRESSURE: 68 MMHG | HEART RATE: 90 BPM | OXYGEN SATURATION: 93 % | WEIGHT: 212 LBS

## 2020-01-29 PROCEDURE — 99214 OFFICE O/P EST MOD 30 MIN: CPT

## 2020-01-29 RX ORDER — TAMSULOSIN HYDROCHLORIDE 0.4 MG/1
0.4 CAPSULE ORAL
Qty: 90 | Refills: 2 | Status: DISCONTINUED | COMMUNITY
Start: 2018-07-17 | End: 2020-01-29

## 2020-01-29 RX ORDER — AMLODIPINE BESYLATE 5 MG/1
5 TABLET ORAL
Refills: 0 | Status: DISCONTINUED | COMMUNITY
End: 2020-01-29

## 2020-02-10 NOTE — PHYSICAL EXAM
[General Appearance - Well Developed] : well developed [Normal Appearance] : normal appearance [General Appearance - Well Nourished] : well nourished [Heart Rate And Rhythm] : heart rate and rhythm were normal [Heart Sounds] : normal S1 and S2 [Edema] : no peripheral edema present [Murmurs] : no murmurs present [Respiration, Rhythm And Depth] : normal respiratory rhythm and effort [Exaggerated Use Of Accessory Muscles For Inspiration] : no accessory muscle use [Abnormal Walk] : normal gait [Nail Clubbing] : no clubbing of the fingernails [Cyanosis, Localized] : no localized cyanosis [] : no rash [No Focal Deficits] : no focal deficits [Oriented To Time, Place, And Person] : oriented to person, place, and time [Impaired Insight] : insight and judgment were intact [Affect] : the affect was normal [Memory Recent] : recent memory was not impaired [FreeTextEntry1] : no chest wall abn

## 2020-02-10 NOTE — DISCUSSION/SUMMARY
[FreeTextEntry1] : Mild  LILLY  post weight loss - Rx not required \par ILD on Esbriet \par Possible GERD

## 2020-02-10 NOTE — HISTORY OF PRESENT ILLNESS
[FreeTextEntry1] : doing well, no new pulmonary complaints\par dyspnea at baseline\par  using cpap with 02 2L, nocturnal\par not using portable\par no fever, chills or chest pain\par no sig sputum\par remains on esbriet, \par \par Lost weight since diagnosis of LILLY in 2013 (243-216 lbs)\par Feels cpap pressure is too high \par Has S10 in [place] and F&P CPAP machine in [place]\par \par 1/29/20

## 2020-04-23 ENCOUNTER — APPOINTMENT (OUTPATIENT)
Dept: UROLOGY | Facility: CLINIC | Age: 80
End: 2020-04-23

## 2020-05-01 ENCOUNTER — APPOINTMENT (OUTPATIENT)
Dept: PULMONOLOGY | Facility: CLINIC | Age: 80
End: 2020-05-01
Payer: MEDICARE

## 2020-05-01 PROCEDURE — 99441: CPT | Mod: CR

## 2020-06-04 ENCOUNTER — APPOINTMENT (OUTPATIENT)
Dept: PULMONOLOGY | Facility: CLINIC | Age: 80
End: 2020-06-04

## 2020-06-16 ENCOUNTER — APPOINTMENT (OUTPATIENT)
Dept: INTERNAL MEDICINE | Facility: CLINIC | Age: 80
End: 2020-06-16
Payer: MEDICARE

## 2020-06-16 VITALS
RESPIRATION RATE: 16 BRPM | SYSTOLIC BLOOD PRESSURE: 156 MMHG | BODY MASS INDEX: 32.96 KG/M2 | WEIGHT: 210 LBS | DIASTOLIC BLOOD PRESSURE: 72 MMHG | OXYGEN SATURATION: 89 % | HEART RATE: 86 BPM | HEIGHT: 67 IN

## 2020-06-16 DIAGNOSIS — I45.10 UNSPECIFIED RIGHT BUNDLE-BRANCH BLOCK: ICD-10-CM

## 2020-06-16 DIAGNOSIS — C61 MALIGNANT NEOPLASM OF PROSTATE: ICD-10-CM

## 2020-06-16 DIAGNOSIS — Z00.00 ENCOUNTER FOR GENERAL ADULT MEDICAL EXAMINATION W/OUT ABNORMAL FINDINGS: ICD-10-CM

## 2020-06-16 PROCEDURE — 36415 COLL VENOUS BLD VENIPUNCTURE: CPT | Mod: CS

## 2020-06-16 PROCEDURE — 99214 OFFICE O/P EST MOD 30 MIN: CPT | Mod: CS,25

## 2020-06-16 RX ORDER — FINASTERIDE 5 MG/1
5 TABLET, FILM COATED ORAL DAILY
Qty: 90 | Refills: 2 | Status: DISCONTINUED | COMMUNITY
Start: 2018-07-17 | End: 2020-06-16

## 2020-06-16 NOTE — PHYSICAL EXAM
[Well Nourished] : well nourished [No Acute Distress] : no acute distress [Well Developed] : well developed [Well-Appearing] : well-appearing [Normal Sclera/Conjunctiva] : normal sclera/conjunctiva [PERRL] : pupils equal round and reactive to light [EOMI] : extraocular movements intact [No JVD] : no jugular venous distention [Normal Oropharynx] : the oropharynx was normal [Normal Outer Ear/Nose] : the outer ears and nose were normal in appearance [Supple] : supple [Thyroid Normal, No Nodules] : the thyroid was normal and there were no nodules present [No Lymphadenopathy] : no lymphadenopathy [No Accessory Muscle Use] : no accessory muscle use [No Respiratory Distress] : no respiratory distress  [Clear to Auscultation] : lungs were clear to auscultation bilaterally [Normal Rate] : normal rate  [Normal S1, S2] : normal S1 and S2 [Regular Rhythm] : with a regular rhythm [No Murmur] : no murmur heard [No Carotid Bruits] : no carotid bruits [No Abdominal Bruit] : a ~M bruit was not heard ~T in the abdomen [Pedal Pulses Present] : the pedal pulses are present [No Edema] : there was no peripheral edema [No Varicosities] : no varicosities [Soft] : abdomen soft [No Palpable Aorta] : no palpable aorta [No Extremity Clubbing/Cyanosis] : no extremity clubbing/cyanosis [No HSM] : no HSM [Non-distended] : non-distended [No Masses] : no abdominal mass palpated [Non Tender] : non-tender [Normal Posterior Cervical Nodes] : no posterior cervical lymphadenopathy [Normal Bowel Sounds] : normal bowel sounds [Normal Anterior Cervical Nodes] : no anterior cervical lymphadenopathy [No CVA Tenderness] : no CVA  tenderness [No Spinal Tenderness] : no spinal tenderness [No Joint Swelling] : no joint swelling [Grossly Normal Strength/Tone] : grossly normal strength/tone [No Focal Deficits] : no focal deficits [Coordination Grossly Intact] : coordination grossly intact [No Rash] : no rash [Deep Tendon Reflexes (DTR)] : deep tendon reflexes were 2+ and symmetric [Normal Gait] : normal gait [Normal Affect] : the affect was normal [Normal Insight/Judgement] : insight and judgment were intact

## 2020-06-17 LAB
ESTIMATED AVERAGE GLUCOSE: 123 MG/DL
HBA1C MFR BLD HPLC: 5.9 %
SARS-COV-2 IGG SERPL IA-ACNC: <0.1 INDEX
SARS-COV-2 IGG SERPL QL IA: NEGATIVE

## 2020-06-17 NOTE — ASSESSMENT
[FreeTextEntry1] : -PMH: HTN, CAD, PVD, Anxiety, Pulm Fibrosis (Dx 2016), Prostate CA, BPH, LILLY\par -SH: Retired. . 4 children. 8 grandchildren. Former Smoker (Quit 1963)\par \par THEODORA is a 79 year M whom is here today for follow up HTN\par \par Follows with the following Physicians: \par -Cardio: Dr. Qureshi\par -Pulm: Dr. Padilla\par -Uro: Dr. Ocampo \par \par -F/u labs drawn in office today\par -Further recs pending lab results\par -Continue f/u w/ Cardio (CAD)\par -Continue f/u w/ Pulm (ILD)\par -Continue f/u w/ Uro (Prostate CA)\par -RTO 6mo for CPE

## 2020-06-17 NOTE — HISTORY OF PRESENT ILLNESS
[FreeTextEntry1] : Follow up HTN [de-identified] : -PMH: HTN, CAD, PVD, Anxiety, Pulm Fibrosis (Dx 2016), Prostate CA, BPH, LILLY\par -SH: Retired. . 4 children. 8 grandchildren. Former Smoker (Quit 1963)\par \par THEODORA is a 79 year M whom is here today for follow up HTN\par Today, pt reports feeling well and is w/o complaints. \par He denies any changes since our last f/u visit\par \par -CAD/PVD/HTN: Remains on Amlodipine 5mg & Simvastatin 20mg. Follows with cardio. No reported changes. \par \par -Anxiety: Remains on on Alprazolam 0.5mg in the AM and occasionally again in the PM. No reported changes. \par \par -Pulm Fibrosis: Dx 2016. Uses Home O2 QHS. Remains on Esbriet. Follows w/ Pulm. No reported changes.\par \par -BPH/Prostate CA: Dx 2/2018. S/p radiation. Stopped taking both Tamsulosin & Finasteride despite recs from his Urologist whom recommends medication. Follows w/ Uro. No reported changes.

## 2020-06-17 NOTE — ADDENDUM
[FreeTextEntry1] : Pre-DM Stable. No new recs. WIll continue to monitor routinely. \par \par COVID AB Negative

## 2020-06-24 ENCOUNTER — APPOINTMENT (OUTPATIENT)
Dept: INTERNAL MEDICINE | Facility: CLINIC | Age: 80
End: 2020-06-24
Payer: MEDICARE

## 2020-06-24 VITALS
SYSTOLIC BLOOD PRESSURE: 132 MMHG | RESPIRATION RATE: 16 BRPM | DIASTOLIC BLOOD PRESSURE: 60 MMHG | OXYGEN SATURATION: 86 % | HEIGHT: 67 IN | HEART RATE: 90 BPM | BODY MASS INDEX: 32.96 KG/M2 | TEMPERATURE: 97.1 F | WEIGHT: 210 LBS

## 2020-06-24 PROCEDURE — 99213 OFFICE O/P EST LOW 20 MIN: CPT

## 2020-06-24 NOTE — PHYSICAL EXAM
[No Respiratory Distress] : no respiratory distress  [Normal] : the outer ears and nose were normal in appearance and the oropharynx was normal [Normal Rate] : normal rate  [No Edema] : there was no peripheral edema [de-identified] : 3x3cm area of erythema on fore head. Minimal area of fluctuance. No draiange noted.

## 2020-06-24 NOTE — HISTORY OF PRESENT ILLNESS
[FreeTextEntry8] : -PMH: HTN, CAD, PVD, Anxiety, Pulm Fibrosis (Dx 2016), Prostate CA, BPH, LILLY\par \par THEODORA is a 79 year M whom is here today w/ c/o redness on his forehead that occurred after an insect bite 3-4 days ago. Pt reports that their was some mild drainage from the area yesterday. He denies any h/o skin infection in the past. He denies any fever or chills. \par

## 2020-08-12 ENCOUNTER — APPOINTMENT (OUTPATIENT)
Dept: PULMONOLOGY | Facility: CLINIC | Age: 80
End: 2020-08-12
Payer: MEDICARE

## 2020-08-12 VITALS
RESPIRATION RATE: 16 BRPM | HEART RATE: 81 BPM | BODY MASS INDEX: 32.89 KG/M2 | WEIGHT: 210 LBS | OXYGEN SATURATION: 87 % | DIASTOLIC BLOOD PRESSURE: 66 MMHG | SYSTOLIC BLOOD PRESSURE: 132 MMHG

## 2020-08-12 DIAGNOSIS — E66.9 OBESITY, UNSPECIFIED: ICD-10-CM

## 2020-08-12 PROCEDURE — 99215 OFFICE O/P EST HI 40 MIN: CPT

## 2020-08-12 RX ORDER — DOXYCYCLINE HYCLATE 100 MG/1
100 TABLET ORAL TWICE DAILY
Qty: 14 | Refills: 0 | Status: DISCONTINUED | COMMUNITY
Start: 2020-06-24 | End: 2020-08-12

## 2020-08-12 NOTE — HISTORY OF PRESENT ILLNESS
[FreeTextEntry1] : \par  [TextBox_4] : doing well, no new pulmonary complaints\par dyspnea at baseline\par off  cpap secondary to weight loss, uses  02 2L, nocturnal\par not using portable\par no fever, chills or chest pain\par no sig sputum\par remains on esbriet, \par \par

## 2020-08-12 NOTE — CONSULT LETTER
[Dear  ___] : Dear  [unfilled], [Consult Letter:] : I had the pleasure of evaluating your patient, [unfilled]. [Please see my note below.] : Please see my note below. [Sincerely,] : Sincerely, [FreeTextEntry3] : Kalpesh Padilla DO [facility]\par Pulmonary Critical Care\par Director Pulmonary Division\par Medical Director Respiratory Therapy\par [facility]\par \par

## 2020-08-12 NOTE — PHYSICAL EXAM
[General Appearance - Well Developed] : well developed [General Appearance - Well Nourished] : well nourished [Normal Appearance] : normal appearance [Heart Sounds] : normal S1 and S2 [Heart Rate And Rhythm] : heart rate and rhythm were normal [Edema] : no peripheral edema present [Murmurs] : no murmurs present [Respiration, Rhythm And Depth] : normal respiratory rhythm and effort [Exaggerated Use Of Accessory Muscles For Inspiration] : no accessory muscle use [Abnormal Walk] : normal gait [Cyanosis, Localized] : no localized cyanosis [Nail Clubbing] : no clubbing of the fingernails [] : no rash [Oriented To Time, Place, And Person] : oriented to person, place, and time [No Focal Deficits] : no focal deficits [Affect] : the affect was normal [Impaired Insight] : insight and judgment were intact [Memory Recent] : recent memory was not impaired [FreeTextEntry1] : moderate air entry bilat with crackles bases

## 2020-08-12 NOTE — DISCUSSION/SUMMARY
[FreeTextEntry1] : ILD favor more chronic Fibrotic HP than IPF, but has been stable on Esbriet, with stable scans and no active GG component, no current environmental exposures by hx\par last PFTs: slight improvement TLC and DLCO 9/19\par Needs 02 with activity, compliance stressed\par Mild LILLY not requiring treatment given weight loss\par tolerating pirfenidone risks/benefits discussed, pt tolerating well, Lfts remain stable, followed by PMD\par taking 2/1/1 pill s, will continue currently as he is doing well\par Followed by Cardiology, last echo 1/19, no sig pulmonary Htn, RV normal, needs follow up\par Will repeat CT scan, duplex, PFts\par Reevaluate in 4-6 months, vaccinations this fall

## 2020-11-12 NOTE — PHYSICAL EXAM
[General Appearance - Well Developed] : well developed [Normal Conjunctiva] : the conjunctiva exhibited no abnormalities [Normal Oral Mucosa] : normal oral mucosa [Normal Jugular Venous V Waves Present] : normal jugular venous V waves present [Heart Rate And Rhythm] : heart rate and rhythm were normal [Heart Sounds] : normal S1 and S2 [Bowel Sounds] : normal bowel sounds [Abdomen Soft] : soft [Abnormal Walk] : normal gait [Nail Clubbing] : no clubbing of the fingernails [Cyanosis, Localized] : no localized cyanosis [Skin Color & Pigmentation] : normal skin color and pigmentation [Skin Turgor] : normal skin turgor [] : no rash [Oriented To Time, Place, And Person] : oriented to person, place, and time [Impaired Insight] : insight and judgment were intact [No Anxiety] : not feeling anxious

## 2020-11-12 NOTE — HISTORY OF PRESENT ILLNESS
[FreeTextEntry1] : Patient with history of hypertension, hyperlipidemia, carotid disease, IPF presenting for routine evaluation. Has history of shortness of breath. Uses O2. No history of significant chest pain. \par Unclear why patient uses plavix. Has had significant bruising. \par \par 11/2020- Stable. Shortness of breath with moderate exertion. \par \par

## 2020-11-12 NOTE — DISCUSSION/SUMMARY
[FreeTextEntry1] : 1. Abnormal EKG: New RBBB. No syncopal episode. TTE  (1/17/2019) LVEF- 65%\par 2. HTN:  Cont. Amlodipine 5 mg.\par 3. Carotid disease: Moderate disease. Stable on current simvastatin. Repeat carotid. \par 4. Idiopathic pulm fibrosis: stable per patient. \par 5. Follow up in 1 year.

## 2020-11-20 NOTE — HISTORY OF PRESENT ILLNESS
[TextBox_4] : doing well, no new pulmonary complaints\par dyspnea at baseline\par off  cpap secondary to weight loss, uses  02 2L, nocturnal\par not using portable\par no fever, chills or chest pain\par no sig sputum\par remains on esbriet, \par \par  [FreeTextEntry1] : \par

## 2020-11-20 NOTE — CONSULT LETTER
[Dear  ___] : Dear  [unfilled], [Consult Letter:] : I had the pleasure of evaluating your patient, [unfilled]. [Please see my note below.] : Please see my note below. [Sincerely,] : Sincerely, [FreeTextEntry3] : [facility] DO [facility]\par Pulmonary Critical Care\par Director Pulmonary Division\par Medical Director Respiratory Therapy\par [facility]\par \par

## 2020-11-20 NOTE — DISCUSSION/SUMMARY
[FreeTextEntry1] : ILD favor more chronic Fibrotic HP than IPF, but has been stable on Esbriet, with stable scans and no active GG component, no current environmental exposures by hx\par PFts with worsening FVC and DLCO, CT scan stable reticular scarring , traction bronchiectasis, honey combing, mosaic, no GG\par Discussed changing to OFEV, will repeat hepatic profile, only able to tolerate lower dose Esbriet\par Needs 02 complaince, 24/7- importance stressed\par Mild LILLY not requiring treatment given weight loss\par Followed by Cardiology, last echo 1/19, no sig pulmonary Htn, RV normal, needs follow up\par had flu vaccine\par prognosis guarded\par Reevaluate in 3 months, vaccinations this fall

## 2020-12-11 PROBLEM — Z01.818 PREOP TESTING: Status: RESOLVED | Noted: 2020-01-01 | Resolved: 2020-01-01

## 2020-12-11 PROBLEM — L03.811 CELLULITIS OF HEAD EXCEPT FACE: Status: RESOLVED | Noted: 2020-06-24 | Resolved: 2020-01-01

## 2020-12-11 PROBLEM — Z20.828 CLOSE EXPOSURE TO COVID-19 VIRUS: Status: RESOLVED | Noted: 2020-06-16 | Resolved: 2020-01-01

## 2020-12-11 PROBLEM — Z92.29 HISTORY OF INFLUENZA VACCINATION: Status: RESOLVED | Noted: 2020-01-01 | Resolved: 2020-01-01

## 2020-12-14 NOTE — ADDENDUM
[FreeTextEntry1] : #1 slightly elevated hematocrit likely in the setting of known pulmonary fibrosis. We'll continue to monitor.\par \par #2 acutely worsened renal function from baseline. This may be medication induced. I do recommend patient increase his p.o. hydration over the next 24-48 hours. Recommend he obtain a repeat CMP this week as one of his liver enzymes was also slightly elevated. Further recommendations pending lab results. He can go to lab or come into office which ever he prefers

## 2020-12-14 NOTE — HISTORY OF PRESENT ILLNESS
[FreeTextEntry1] : Medicare Annual Well visit [de-identified] : -PMH: HTN, CAD, PVD, Anxiety, Pulm Fibrosis (Dx 2016), Prostate CA, BPH, LILLY\par -SH: Retired. . 4 children. 8 grandchildren. Former Smoker (Quit 1963)\par \par THEODORA is a 80 year M whom is here today for an annual well check\par Today, pt reports feeling well but continues to feel tired\par Since last visit, pt was seen by Pulm and was noted to have worsening PFTs and was switched to OFEV. Pt remains non-compliant with supplemental O2 which was advised to be used 24/7 per pulm. \par \par Specialists Involved:\par -Cardio: Dr. Qureshi\par -Pulm: Dr. Padilla\par -Uro: Dr. Ocampo \par \par -Vaccines: Needs Shingles (Declins but will call Ins Co first)\par -DEXA: Scrip given today\par -FH of Colon, breast or ovarian CA: None known\par \par -CAD/PVD/HTN: Remains on Amlodipine 5mg & Simvastatin 20mg. Follows with cardio. No reported changes. \par \par -Pulm Fibrosis: Dx 2016. Uses Home O2 QHS but non-complaint with the recommendation of 24/7 use. Now on Ofev. Follows w/ Pulm. No reported changes.\par \par -Anxiety: Remains on on Alprazolam 0.5mg in the AM and occasionally again in the PM. Declines SSRI therapy. No reported changes. \par -BPH/Prostate CA: Dx 2/2018. S/p radiation. Stopped taking both Tamsulosin & Finasteride despite recs from his Urologist whom recommends medication. Follows w/ Uro. No reported changes.

## 2020-12-14 NOTE — ASSESSMENT
[FreeTextEntry1] : -PMH: HTN, CAD, PVD, Anxiety, Pulm Fibrosis (Dx 2016), Prostate CA, BPH, LILLY\par -SH: Retired. . 4 children. 8 grandchildren. Former Smoker (Quit 1963)\par \par THEODORA is a 79 year M whom is here today for follow up HTN\par \par Specialist Involved:\par -Cardio: Dr. Qureshi\par -Pulm: Dr. Padilla\par -Uro: Dr. Ocampo \par \par -F/u labs drawn in office today\par -Further recs pending lab results\par -F/u DEXA\par -Continue f/u w/ Cardio (CAD)\par -Continue f/u w/ Pulm (ILD)\par -Continue f/u w/ Uro (Prostate CA)\par -RTO 6mo for routine f/u & labs or sooner if needed & Complete MOLST which was given today

## 2020-12-14 NOTE — HEALTH RISK ASSESSMENT
[Very Good] : ~his/her~  mood as very good [No falls in past year] : Patient reported no falls in the past year [0] : 2) Feeling down, depressed, or hopeless: Not at all (0) [] :  [# Of Children ___] : has [unfilled] children [Reviewed no changes] : Reviewed no changes [No] : No [None] : None [With Family] : lives with family [Fully functional (bathing, dressing, toileting, transferring, walking, feeding)] : Fully functional (bathing, dressing, toileting, transferring, walking, feeding) [Fully functional (using the telephone, shopping, preparing meals, housekeeping, doing laundry, using] : Fully functional and needs no help or supervision to perform IADLs (using the telephone, shopping, preparing meals, housekeeping, doing laundry, using transportation, managing medications and managing finances) [DNR] : DNR [DNI] : DNI [] : No [YearQuit] : 1963 [Audit-CScore] : 0 [[id number]] : 0 [Reports changes in hearing] : Reports no changes in hearing [Reports changes in vision] : Reports no changes in vision [AdvancecareDate] : 12/20

## 2020-12-18 NOTE — PHYSICAL EXAM
[Normal] : no jugular venous distention, supple, no lymphadenopathy and the thyroid was normal and there were no nodules present [No Respiratory Distress] : no respiratory distress  [Normal Rate] : normal rate  [No Edema] : there was no peripheral edema

## 2020-12-18 NOTE — ASSESSMENT
[FreeTextEntry1] : -PMH: HTN, CAD, PVD, Anxiety, Pulm Fibrosis (Dx 2016), Prostate CA, BPH, LILLY\par -SH: Retired. . 4 children. 8 grandchildren. Former Smoker (Quit 1963)\par \par THEODORA is a 79 year M whom is here today for follow up HTN\par \par Specialist Involved:\par -Cardio: Dr. Qureshi\par -Pulm: Dr. Padilla\par -Uro: Dr. Ocampo \par \par -F/u labs drawn in office today\par -Further recs pending lab results\par -F/u DEXA\par -Continue f/u w/ Cardio (CAD)\par -Continue f/u w/ Pulm (ILD)\par -Continue f/u w/ Uro (Prostate CA)\par -RTO 6mo for routine f/u & labs or sooner if needed & Complete MOLST

## 2020-12-18 NOTE — HISTORY OF PRESENT ILLNESS
Is This A New Presentation, Or A Follow-Up?: Follow Up Irritated Seborrheic Keratoses [FreeTextEntry1] : CHUN [de-identified] : -PMH: HTN, CAD, PVD, Anxiety, Pulm Fibrosis (Dx 2016), Prostate CA, BPH, LILLY\par -SH: Retired. . 4 children. 8 grandchildren. Former Smoker (Quit 1963)\par \par THEODORA is a 80 year M whom is here today for f/u CHUN noted on recent labs\par Today, pt reports feeling well\par She has increased her p.o. hydration as advised based on recently. He denies any other medication changes. Declines chronic NSAID. \par No other reported changes since our last followup visit\par \par -CAD/PVD/HTN: Remains on Amlodipine 5mg & Simvastatin 20mg. Follows with cardio. No reported changes. \par \par -Pulm Fibrosis: Dx 2016. Uses Home O2 QHS but non-complaint with the recommendation of 24/7 use. Now on Ofev due to worsening PFTs. Follows w/ Pulm. No reported changes. \par \par -Anxiety: Remains on on Alprazolam 0.5mg in the AM and occasionally again in the PM. Declines SSRI therapy. No reported changes. \par -BPH/Prostate CA: Dx 2/2018. S/p radiation. Stopped taking both Tamsulosin & Finasteride despite recs from his Urologist whom recommends medication. Follows w/ Uro. No reported changes.

## 2021-01-01 ENCOUNTER — NON-APPOINTMENT (OUTPATIENT)
Age: 81
End: 2021-01-01

## 2021-01-01 ENCOUNTER — APPOINTMENT (OUTPATIENT)
Dept: CT IMAGING | Facility: CLINIC | Age: 81
End: 2021-01-01
Payer: MEDICARE

## 2021-01-01 ENCOUNTER — APPOINTMENT (OUTPATIENT)
Dept: INTERNAL MEDICINE | Facility: CLINIC | Age: 81
End: 2021-01-01
Payer: MEDICARE

## 2021-01-01 ENCOUNTER — OUTPATIENT (OUTPATIENT)
Dept: OUTPATIENT SERVICES | Facility: HOSPITAL | Age: 81
LOS: 1 days | End: 2021-01-01
Payer: MEDICARE

## 2021-01-01 ENCOUNTER — RX RENEWAL (OUTPATIENT)
Age: 81
End: 2021-01-01

## 2021-01-01 ENCOUNTER — TRANSCRIPTION ENCOUNTER (OUTPATIENT)
Age: 81
End: 2021-01-01

## 2021-01-01 ENCOUNTER — APPOINTMENT (OUTPATIENT)
Dept: CARDIOLOGY | Facility: CLINIC | Age: 81
End: 2021-01-01
Payer: MEDICARE

## 2021-01-01 ENCOUNTER — APPOINTMENT (OUTPATIENT)
Dept: PULMONOLOGY | Facility: CLINIC | Age: 81
End: 2021-01-01
Payer: MEDICARE

## 2021-01-01 ENCOUNTER — APPOINTMENT (OUTPATIENT)
Dept: PULMONOLOGY | Facility: CLINIC | Age: 81
End: 2021-01-01

## 2021-01-01 VITALS
TEMPERATURE: 97.7 F | HEART RATE: 82 BPM | SYSTOLIC BLOOD PRESSURE: 139 MMHG | DIASTOLIC BLOOD PRESSURE: 70 MMHG | OXYGEN SATURATION: 96 %

## 2021-01-01 VITALS — HEART RATE: 94 BPM | OXYGEN SATURATION: 92 %

## 2021-01-01 VITALS
HEIGHT: 67 IN | DIASTOLIC BLOOD PRESSURE: 78 MMHG | BODY MASS INDEX: 28.72 KG/M2 | WEIGHT: 183 LBS | SYSTOLIC BLOOD PRESSURE: 120 MMHG

## 2021-01-01 VITALS
WEIGHT: 187 LBS | RESPIRATION RATE: 16 BRPM | OXYGEN SATURATION: 93 % | BODY MASS INDEX: 29.35 KG/M2 | SYSTOLIC BLOOD PRESSURE: 126 MMHG | HEART RATE: 82 BPM | DIASTOLIC BLOOD PRESSURE: 74 MMHG | TEMPERATURE: 96.8 F | HEIGHT: 67 IN

## 2021-01-01 VITALS
SYSTOLIC BLOOD PRESSURE: 118 MMHG | HEIGHT: 67 IN | HEART RATE: 80 BPM | DIASTOLIC BLOOD PRESSURE: 70 MMHG | BODY MASS INDEX: 28.72 KG/M2 | OXYGEN SATURATION: 97 % | TEMPERATURE: 98.7 F | WEIGHT: 183 LBS

## 2021-01-01 VITALS — RESPIRATION RATE: 16 BRPM | HEART RATE: 81 BPM

## 2021-01-01 VITALS
OXYGEN SATURATION: 97 % | HEART RATE: 71 BPM | DIASTOLIC BLOOD PRESSURE: 77 MMHG | SYSTOLIC BLOOD PRESSURE: 123 MMHG | RESPIRATION RATE: 18 BRPM | TEMPERATURE: 98 F

## 2021-01-01 VITALS — WEIGHT: 192 LBS | BODY MASS INDEX: 30.07 KG/M2

## 2021-01-01 VITALS — RESPIRATION RATE: 16 BRPM | DIASTOLIC BLOOD PRESSURE: 67 MMHG | SYSTOLIC BLOOD PRESSURE: 123 MMHG

## 2021-01-01 VITALS — OXYGEN SATURATION: 89 %

## 2021-01-01 DIAGNOSIS — E78.5 HYPERLIPIDEMIA, UNSPECIFIED: ICD-10-CM

## 2021-01-01 DIAGNOSIS — Z98.890 OTHER SPECIFIED POSTPROCEDURAL STATES: Chronic | ICD-10-CM

## 2021-01-01 DIAGNOSIS — I10 ESSENTIAL (PRIMARY) HYPERTENSION: ICD-10-CM

## 2021-01-01 DIAGNOSIS — Z96.649 PRESENCE OF UNSPECIFIED ARTIFICIAL HIP JOINT: Chronic | ICD-10-CM

## 2021-01-01 DIAGNOSIS — M85.80 OTHER SPECIFIED DISORDERS OF BONE DENSITY AND STRUCTURE, UNSPECIFIED SITE: ICD-10-CM

## 2021-01-01 DIAGNOSIS — R06.02 SHORTNESS OF BREATH: ICD-10-CM

## 2021-01-01 DIAGNOSIS — G47.33 OBSTRUCTIVE SLEEP APNEA (ADULT) (PEDIATRIC): ICD-10-CM

## 2021-01-01 DIAGNOSIS — R26.81 UNSTEADINESS ON FEET: ICD-10-CM

## 2021-01-01 DIAGNOSIS — Z87.898 PERSONAL HISTORY OF OTHER SPECIFIED CONDITIONS: ICD-10-CM

## 2021-01-01 DIAGNOSIS — F41.9 ANXIETY DISORDER, UNSPECIFIED: ICD-10-CM

## 2021-01-01 DIAGNOSIS — I27.20 PULMONARY HYPERTENSION, UNSPECIFIED: ICD-10-CM

## 2021-01-01 DIAGNOSIS — J84.112 IDIOPATHIC PULMONARY FIBROSIS: ICD-10-CM

## 2021-01-01 DIAGNOSIS — D75.89 OTHER SPECIFIED DISEASES OF BLOOD AND BLOOD-FORMING ORGANS: ICD-10-CM

## 2021-01-01 DIAGNOSIS — J67.9 HYPERSENSITIVITY PNEUMONITIS DUE TO UNSPECIFIED ORGANIC DUST: ICD-10-CM

## 2021-01-01 DIAGNOSIS — R09.02 HYPOXEMIA: ICD-10-CM

## 2021-01-01 DIAGNOSIS — R73.03 PREDIABETES.: ICD-10-CM

## 2021-01-01 DIAGNOSIS — J96.21 ACUTE AND CHRONIC RESPIRATORY FAILURE WITH HYPOXIA: ICD-10-CM

## 2021-01-01 DIAGNOSIS — R06.00 DYSPNEA, UNSPECIFIED: ICD-10-CM

## 2021-01-01 DIAGNOSIS — I77.9 DISORDER OF ARTERIES AND ARTERIOLES, UNSPECIFIED: ICD-10-CM

## 2021-01-01 DIAGNOSIS — J84.9 INTERSTITIAL PULMONARY DISEASE, UNSPECIFIED: ICD-10-CM

## 2021-01-01 DIAGNOSIS — N17.9 ACUTE KIDNEY FAILURE, UNSPECIFIED: ICD-10-CM

## 2021-01-01 DIAGNOSIS — Z90.49 ACQUIRED ABSENCE OF OTHER SPECIFIED PARTS OF DIGESTIVE TRACT: Chronic | ICD-10-CM

## 2021-01-01 LAB
ALBUMIN SERPL ELPH-MCNC: 3.7 G/DL
ALBUMIN SERPL ELPH-MCNC: 4.1 G/DL
ALP BLD-CCNC: 47 U/L
ALP BLD-CCNC: 60 U/L
ALT SERPL-CCNC: 39 U/L
ALT SERPL-CCNC: 49 U/L
ANION GAP SERPL CALC-SCNC: 11 MMOL/L
ANION GAP SERPL CALC-SCNC: 18 MMOL/L
ANION GAP SERPL CALC-SCNC: 5 MMOL/L — SIGNIFICANT CHANGE UP (ref 5–17)
AST SERPL-CCNC: 40 U/L
AST SERPL-CCNC: 54 U/L
BASOPHILS # BLD AUTO: 0.02 K/UL — SIGNIFICANT CHANGE UP (ref 0–0.2)
BASOPHILS # BLD AUTO: 0.04 K/UL
BASOPHILS NFR BLD AUTO: 0.3 % — SIGNIFICANT CHANGE UP (ref 0–2)
BASOPHILS NFR BLD AUTO: 0.4 %
BILIRUB DIRECT SERPL-MCNC: 0.2 MG/DL
BILIRUB INDIRECT SERPL-MCNC: 0.5 MG/DL
BILIRUB SERPL-MCNC: 0.8 MG/DL
BILIRUB SERPL-MCNC: 0.8 MG/DL
BUN SERPL-MCNC: 21.6 MG/DL — HIGH (ref 8–20)
BUN SERPL-MCNC: 24 MG/DL
BUN SERPL-MCNC: 24 MG/DL
CALCIUM SERPL-MCNC: 10.1 MG/DL
CALCIUM SERPL-MCNC: 10.2 MG/DL
CALCIUM SERPL-MCNC: 9.4 MG/DL — SIGNIFICANT CHANGE UP (ref 8.6–10.2)
CHLORIDE SERPL-SCNC: 102 MMOL/L — SIGNIFICANT CHANGE UP (ref 98–107)
CHLORIDE SERPL-SCNC: 97 MMOL/L
CHLORIDE SERPL-SCNC: 98 MMOL/L
CHOLEST SERPL-MCNC: 134 MG/DL — SIGNIFICANT CHANGE UP
CHOLEST SERPL-MCNC: 153 MG/DL
CO2 SERPL-SCNC: 25 MMOL/L
CO2 SERPL-SCNC: 31 MMOL/L
CO2 SERPL-SCNC: 33 MMOL/L — HIGH (ref 22–29)
CREAT SERPL-MCNC: 1.21 MG/DL — SIGNIFICANT CHANGE UP (ref 0.5–1.3)
CREAT SERPL-MCNC: 1.24 MG/DL
CREAT SERPL-MCNC: 1.25 MG/DL
EOSINOPHIL # BLD AUTO: 0.19 K/UL — SIGNIFICANT CHANGE UP (ref 0–0.5)
EOSINOPHIL # BLD AUTO: 0.25 K/UL
EOSINOPHIL NFR BLD AUTO: 2.5 %
EOSINOPHIL NFR BLD AUTO: 2.5 % — SIGNIFICANT CHANGE UP (ref 0–6)
ESTIMATED AVERAGE GLUCOSE: 131 MG/DL
FOLATE SERPL-MCNC: 15.2 NG/ML
GLUCOSE SERPL-MCNC: 105 MG/DL — HIGH (ref 70–99)
GLUCOSE SERPL-MCNC: 109 MG/DL
GLUCOSE SERPL-MCNC: 109 MG/DL
HBA1C MFR BLD HPLC: 6.2 %
HCT VFR BLD CALC: 51.9 % — HIGH (ref 39–50)
HCT VFR BLD CALC: 56.6 %
HDLC SERPL-MCNC: 58 MG/DL — SIGNIFICANT CHANGE UP
HDLC SERPL-MCNC: 62 MG/DL
HGB BLD-MCNC: 17.1 G/DL — HIGH (ref 13–17)
HGB BLD-MCNC: 17.8 G/DL
IMM GRANULOCYTES NFR BLD AUTO: 0.4 %
IMM GRANULOCYTES NFR BLD AUTO: 0.4 % — SIGNIFICANT CHANGE UP (ref 0–1.5)
LDLC SERPL CALC-MCNC: 71 MG/DL
LIPID PNL WITH DIRECT LDL SERPL: 60 MG/DL — SIGNIFICANT CHANGE UP
LYMPHOCYTES # BLD AUTO: 0.68 K/UL
LYMPHOCYTES # BLD AUTO: 0.98 K/UL — LOW (ref 1–3.3)
LYMPHOCYTES # BLD AUTO: 12.9 % — LOW (ref 13–44)
LYMPHOCYTES NFR BLD AUTO: 6.8 %
MAGNESIUM SERPL-MCNC: 2.1 MG/DL — SIGNIFICANT CHANGE UP (ref 1.6–2.6)
MAN DIFF?: NORMAL
MCHC RBC-ENTMCNC: 31.4 GM/DL
MCHC RBC-ENTMCNC: 32.6 PG — SIGNIFICANT CHANGE UP (ref 27–34)
MCHC RBC-ENTMCNC: 32.7 PG
MCHC RBC-ENTMCNC: 32.9 GM/DL — SIGNIFICANT CHANGE UP (ref 32–36)
MCV RBC AUTO: 103.9 FL
MCV RBC AUTO: 99 FL — SIGNIFICANT CHANGE UP (ref 80–100)
MONOCYTES # BLD AUTO: 0.51 K/UL — SIGNIFICANT CHANGE UP (ref 0–0.9)
MONOCYTES # BLD AUTO: 0.55 K/UL
MONOCYTES NFR BLD AUTO: 5.5 %
MONOCYTES NFR BLD AUTO: 6.7 % — SIGNIFICANT CHANGE UP (ref 2–14)
NEUTROPHILS # BLD AUTO: 5.88 K/UL — SIGNIFICANT CHANGE UP (ref 1.8–7.4)
NEUTROPHILS # BLD AUTO: 8.38 K/UL
NEUTROPHILS NFR BLD AUTO: 77.2 % — HIGH (ref 43–77)
NEUTROPHILS NFR BLD AUTO: 84.4 %
NON HDL CHOLESTEROL: 76 MG/DL — SIGNIFICANT CHANGE UP
NONHDLC SERPL-MCNC: 91 MG/DL
PLATELET # BLD AUTO: 161 K/UL — SIGNIFICANT CHANGE UP (ref 150–400)
PLATELET # BLD AUTO: 177 K/UL
POTASSIUM SERPL-MCNC: 4.2 MMOL/L — SIGNIFICANT CHANGE UP (ref 3.5–5.3)
POTASSIUM SERPL-SCNC: 4.2 MMOL/L — SIGNIFICANT CHANGE UP (ref 3.5–5.3)
POTASSIUM SERPL-SCNC: 4.5 MMOL/L
POTASSIUM SERPL-SCNC: 4.6 MMOL/L
PROT SERPL-MCNC: 6.5 G/DL
PROT SERPL-MCNC: 7.1 G/DL
RBC # BLD: 5.24 M/UL — SIGNIFICANT CHANGE UP (ref 4.2–5.8)
RBC # BLD: 5.45 M/UL
RBC # FLD: 13.9 % — SIGNIFICANT CHANGE UP (ref 10.3–14.5)
RBC # FLD: 14.4 %
SODIUM SERPL-SCNC: 139 MMOL/L
SODIUM SERPL-SCNC: 140 MMOL/L
SODIUM SERPL-SCNC: 140 MMOL/L — SIGNIFICANT CHANGE UP (ref 135–145)
TRIGL SERPL-MCNC: 101 MG/DL
TRIGL SERPL-MCNC: 81 MG/DL — SIGNIFICANT CHANGE UP
VIT B12 SERPL-MCNC: 430 PG/ML
WBC # BLD: 7.61 K/UL — SIGNIFICANT CHANGE UP (ref 3.8–10.5)
WBC # FLD AUTO: 7.61 K/UL — SIGNIFICANT CHANGE UP (ref 3.8–10.5)
WBC # FLD AUTO: 9.94 K/UL

## 2021-01-01 PROCEDURE — 99214 OFFICE O/P EST MOD 30 MIN: CPT | Mod: 25

## 2021-01-01 PROCEDURE — 71275 CT ANGIOGRAPHY CHEST: CPT

## 2021-01-01 PROCEDURE — 93000 ELECTROCARDIOGRAM COMPLETE: CPT

## 2021-01-01 PROCEDURE — 36415 COLL VENOUS BLD VENIPUNCTURE: CPT

## 2021-01-01 PROCEDURE — 71275 CT ANGIOGRAPHY CHEST: CPT | Mod: 26,MH

## 2021-01-01 PROCEDURE — C1887: CPT

## 2021-01-01 PROCEDURE — 93456 R HRT CORONARY ARTERY ANGIO: CPT | Mod: 26

## 2021-01-01 PROCEDURE — 80061 LIPID PANEL: CPT

## 2021-01-01 PROCEDURE — 85025 COMPLETE CBC W/AUTO DIFF WBC: CPT

## 2021-01-01 PROCEDURE — 93463 DRUG ADMIN & HEMODYNMIC MEAS: CPT

## 2021-01-01 PROCEDURE — 93005 ELECTROCARDIOGRAM TRACING: CPT

## 2021-01-01 PROCEDURE — 99214 OFFICE O/P EST MOD 30 MIN: CPT

## 2021-01-01 PROCEDURE — 99215 OFFICE O/P EST HI 40 MIN: CPT

## 2021-01-01 PROCEDURE — 83735 ASSAY OF MAGNESIUM: CPT

## 2021-01-01 PROCEDURE — C1769: CPT

## 2021-01-01 PROCEDURE — C1889: CPT

## 2021-01-01 PROCEDURE — 93306 TTE W/DOPPLER COMPLETE: CPT

## 2021-01-01 PROCEDURE — 80048 BASIC METABOLIC PNL TOTAL CA: CPT

## 2021-01-01 PROCEDURE — 93010 ELECTROCARDIOGRAM REPORT: CPT

## 2021-01-01 PROCEDURE — 93456 R HRT CORONARY ARTERY ANGIO: CPT

## 2021-01-01 PROCEDURE — C1894: CPT

## 2021-01-01 RX ORDER — TREPROSTINIL 1.74 MG/2.9ML
0.6 INHALANT ORAL
Qty: 1 | Refills: 2 | Status: ACTIVE | COMMUNITY
Start: 2021-01-01 | End: 1900-01-01

## 2021-01-01 RX ORDER — HYDROXYAPATITE
POWDER (GRAM) MISCELLANEOUS
Refills: 0 | Status: ACTIVE | COMMUNITY

## 2021-01-01 RX ORDER — CHLORHEXIDINE GLUCONATE 213 G/1000ML
1 SOLUTION TOPICAL ONCE
Refills: 0 | Status: DISCONTINUED | OUTPATIENT
Start: 2021-01-01 | End: 2021-01-01

## 2021-01-01 RX ORDER — MAGNESIUM 200 MG
200 TABLET ORAL
Refills: 0 | Status: DISCONTINUED | COMMUNITY
End: 2021-01-01

## 2021-01-01 RX ORDER — GUAIFENESIN 600 MG/1
TABLET, EXTENDED RELEASE ORAL
Refills: 0 | Status: ACTIVE | COMMUNITY

## 2021-01-01 RX ORDER — PREDNISONE 10 MG/1
10 TABLET ORAL
Qty: 90 | Refills: 0 | Status: ACTIVE | COMMUNITY
Start: 2021-01-01 | End: 1900-01-01

## 2021-01-01 RX ORDER — OMEGA-3 ACID ETHYL ESTERS 1 G
1 CAPSULE ORAL
Qty: 0 | Refills: 0 | DISCHARGE

## 2021-01-01 RX ORDER — ALPRAZOLAM 0.5 MG/1
0.5 TABLET ORAL
Qty: 30 | Refills: 0 | Status: ACTIVE | COMMUNITY
Start: 2017-07-19 | End: 1900-01-01

## 2021-01-01 RX ORDER — CHOLECALCIFEROL (VITAMIN D3) 125 MCG
1 CAPSULE ORAL
Qty: 0 | Refills: 0 | DISCHARGE

## 2021-01-01 RX ORDER — ASPIRIN/CALCIUM CARB/MAGNESIUM 324 MG
1 TABLET ORAL
Qty: 0 | Refills: 0 | DISCHARGE

## 2021-01-01 RX ORDER — SIMVASTATIN 20 MG/1
20 TABLET, FILM COATED ORAL
Qty: 90 | Refills: 1 | Status: ACTIVE | COMMUNITY
Start: 2020-04-21 | End: 1900-01-01

## 2021-01-01 RX ORDER — NINTEDANIB 150 MG/1
150 CAPSULE ORAL
Qty: 90 | Refills: 3 | Status: COMPLETED | COMMUNITY
Start: 2020-01-01 | End: 2021-01-01

## 2021-01-01 RX ORDER — CALCIUM CITRATE 200(950)MG
200 TABLET ORAL DAILY
Refills: 0 | Status: DISCONTINUED | COMMUNITY
Start: 2019-01-29 | End: 2021-01-01

## 2021-01-01 RX ORDER — UBIDECARENONE 100 MG
100 CAPSULE ORAL DAILY
Refills: 0 | Status: ACTIVE | COMMUNITY

## 2021-01-01 RX ORDER — NINTEDANIB 100 MG/1
1 CAPSULE ORAL
Qty: 0 | Refills: 0 | DISCHARGE

## 2021-01-01 RX ORDER — MULTIVIT WITH MIN/MFOLATE/K2 340-15/3 G
1 POWDER (GRAM) ORAL
Qty: 0 | Refills: 0 | DISCHARGE

## 2021-01-01 RX ORDER — GAUZE BANDAGE 4" X 4"
1000 BANDAGE TOPICAL DAILY
Refills: 0 | Status: ACTIVE | COMMUNITY

## 2021-01-01 RX ORDER — MULTIVIT-MIN/FOLIC/VIT K/LYCOP 400-300MCG
25 MCG TABLET ORAL DAILY
Refills: 0 | Status: ACTIVE | COMMUNITY

## 2021-01-01 RX ORDER — AMLODIPINE BESYLATE 2.5 MG/1
1 TABLET ORAL
Qty: 0 | Refills: 0 | DISCHARGE

## 2021-01-01 RX ORDER — UBIDECARENONE 100 MG
1 CAPSULE ORAL
Qty: 0 | Refills: 0 | DISCHARGE

## 2021-01-01 RX ORDER — PREGABALIN 225 MG/1
1 CAPSULE ORAL
Qty: 0 | Refills: 0 | DISCHARGE

## 2021-01-01 RX ORDER — NINTEDANIB 100 MG/1
100 CAPSULE ORAL TWICE DAILY
Qty: 180 | Refills: 3 | Status: ACTIVE | COMMUNITY
Start: 2021-01-01 | End: 1900-01-01

## 2021-02-25 NOTE — CONSULT LETTER
[Dear  ___] : Dear  [unfilled], [Consult Letter:] : I had the pleasure of evaluating your patient, [unfilled]. [Please see my note below.] : Please see my note below. [Sincerely,] : Sincerely, [FreeTextEntry3] : Kalpesh Padilla DO [facility]\par Pulmonary Critical Care\par Director Pulmonary Division\par Medical Director Respiratory Therapy\par [facility]\par \par

## 2021-02-25 NOTE — HISTORY OF PRESENT ILLNESS
[TextBox_4] :  no new pulmonary complaints\par chronic cough\par dyspnea at baseline\par off  cpap secondary to weight loss, uses  02 2L, nocturnal\par not using portable\par no fever, chills or chest pain\par no sig sputum\par abdominal complaints worse now  on OFEV 150\par \par  [FreeTextEntry1] : \par

## 2021-02-25 NOTE — DISCUSSION/SUMMARY
[FreeTextEntry1] : ILD favor more chronic Fibrotic HP than IPF, but has been stable on Esbriet, with stable scans and no active GG component, no current environmental exposures by hx\par PFts with worsening FVC and DLCO, CT scan stable reticular scarring , traction bronchiectasis, honey combing, mosaic, no GG\par Decrease OFEV to daily ,  will repeat hepatic profile, \par Needs 02 compliance, 24/7- importance stressed, per pt follow 02 at home\par Mild LILLY not requiring treatment given weight loss\par Followed by Cardiology, last echo 1/19, no sig pulmonary Htn, RV normal, needs follow up\par start low dose prednisone\par had flu vaccine\par prognosis guarded\par Reevaluate in 3 months, vaccinations this fall

## 2021-02-25 NOTE — PROCEDURE
[FreeTextEntry1] : CT scan 10/20 stable Fibrosis\par PFTs 10/20 severe restriction and impaired DLCO, progressive decrease from 9/19

## 2021-05-17 PROBLEM — G47.33 OSA (OBSTRUCTIVE SLEEP APNEA): Status: ACTIVE | Noted: 2019-12-12

## 2021-05-17 PROBLEM — J67.9 HYPERSENSITIVITY PNEUMONITIS: Status: ACTIVE | Noted: 2020-08-12

## 2021-05-17 NOTE — PHYSICAL EXAM
[General Appearance - Well Developed] : well developed [Normal Appearance] : normal appearance [General Appearance - Well Nourished] : well nourished [Heart Rate And Rhythm] : heart rate and rhythm were normal [Murmurs] : no murmurs present [Heart Sounds] : normal S1 and S2 [Edema] : no peripheral edema present [Respiration, Rhythm And Depth] : normal respiratory rhythm and effort [Exaggerated Use Of Accessory Muscles For Inspiration] : no accessory muscle use [Abnormal Walk] : normal gait [Cyanosis, Localized] : no localized cyanosis [Nail Clubbing] : no clubbing of the fingernails [] : no rash [No Focal Deficits] : no focal deficits [Oriented To Time, Place, And Person] : oriented to person, place, and time [Impaired Insight] : insight and judgment were intact [Affect] : the affect was normal [Memory Recent] : recent memory was not impaired [FreeTextEntry1] : no chest wall abn

## 2021-05-17 NOTE — DISCUSSION/SUMMARY
[FreeTextEntry1] : ILD favor more chronic Fibrotic HP than IPF,, CT scan stable reticular scarring , traction bronchiectasis, honey combing, mosaic, no GG, progresive\par Problems with OFev, GI complaints, decrease to 100 nightly\par add low dose prednisone 10 mg daily, as trial \par continue 02\par Needs 02 compliance, 24/7- importance stressed, per pt follow 02 at home\par Mild LILLY not requiring treatment given weight loss\par Followed by Cardiology, last echo 1/19, no sig pulmonary Htn, needs follow up scan, may be Tyvaso candidate if PH-ILD\par Will repeat CT scan\par had covid vaccine\par 2 months or sooner if needed\par \par

## 2021-05-17 NOTE — CONSULT LETTER
[Dear  ___] : Dear  [unfilled], [Consult Letter:] : I had the pleasure of evaluating your patient, [unfilled]. [Please see my note below.] : Please see my note below. [Sincerely,] : Sincerely, [FreeTextEntry3] : Kalpesh [facility] DO [facility]\par Pulmonary Critical Care\par Director Pulmonary Division\par Medical Director Respiratory Therapy\par [facility]\par \par

## 2021-06-23 PROBLEM — Z87.898 HISTORY OF URINARY FREQUENCY: Status: RESOLVED | Noted: 2018-07-17 | Resolved: 2021-01-01

## 2021-06-23 PROBLEM — Z87.898 HISTORY OF URINARY URGENCY: Status: RESOLVED | Noted: 2018-07-17 | Resolved: 2021-01-01

## 2021-06-23 PROBLEM — F41.9 ANXIETY: Status: ACTIVE | Noted: 2019-01-29

## 2021-06-23 PROBLEM — M85.80 OSTEOPENIA, UNSPECIFIED LOCATION: Status: ACTIVE | Noted: 2021-01-01

## 2021-06-23 PROBLEM — E78.5 HYPERLIPIDEMIA: Status: ACTIVE | Noted: 2020-04-21

## 2021-06-23 PROBLEM — N17.9 ACUTE KIDNEY INJURY: Status: RESOLVED | Noted: 2020-01-01 | Resolved: 2021-01-01

## 2021-06-23 PROBLEM — R26.81 GAIT INSTABILITY: Status: ACTIVE | Noted: 2020-01-01

## 2021-06-23 PROBLEM — Z87.898 HISTORY OF URINARY RETENTION: Status: RESOLVED | Noted: 2019-10-22 | Resolved: 2021-01-01

## 2021-06-23 PROBLEM — R73.03 PREDIABETES: Status: ACTIVE | Noted: 2018-08-17

## 2021-06-23 NOTE — ASSESSMENT
[FreeTextEntry1] : -PMH: HTN, CAD, PVD, Anxiety, Pulm Fibrosis (Dx 2016), Prostate CA, BPH, LILLY, Osteopenia\par -SH: Retired. . 4 children. 8 grandchildren. Former Smoker (Quit 1963)\par \par THEODORA is a 79 year M whom is here today for follow up HTN\par \par Specialist Involved:\par -Cardio: Dr. Qureshi\par -Pulm: Dr. Padilla\par -Uro: Dr. Ocampo\par \par -F/u labs drawn in office today\par -Further recs pending lab results\par -Continue f/u w/ Cardio (CAD)\par -Continue f/u w/ Pulm (ILD)\par -Continue f/u w/ Uro (Prostate CA)\par -RTO 6mo for routine f/u & labs or sooner if needed & Complete MOLST

## 2021-06-23 NOTE — HISTORY OF PRESENT ILLNESS
[FreeTextEntry1] : CHUN [de-identified] : -PMH: HTN, CAD, PVD, Anxiety, Pulm Fibrosis (Dx 2016), Prostate CA, BPH, LILLY\par -SH: Retired. . 4 children. 8 grandchildren. Former Smoker (Quit 1963)\par \par THEODORA is a 80 year M whom is here today for f/u HTN, CAD, Anxiety\par Today, pt reports feeling well\par He denies any changes since our last visit\par \par -CAD/PVD/HTN: Remains on Amlodipine 5mg & Simvastatin 20mg. Follows with cardio. No reported changes. \par \par -Pulm Fibrosis: Dx 2016. Uses Home O2 QHS but non-complaint with the recommendation of 24/7 use. Now on Ofev due to worsening PFTs. Follows w/ Pulm. No reported changes. \par \par -Gait Instability: He has home PT 2x/wk. Denies recent falls. \par -Anxiety: Remains on on Alprazolam 0.5mg in the AM and occasionally again in the PM. Declines SSRI therapy. No reported changes. \par -BPH/Prostate CA: Dx 2/2018. S/p radiation. Stopped taking both Tamsulosin & Finasteride despite recs from his Urologist whom recommends medication. Follows w/ Uro. No reported changes. \par -Osteopenia: (3/12/21) DEXA: Osteopenia with lowest T score -1.1 right forearm. Remains on Vit-D3

## 2021-06-24 PROBLEM — D75.89 MACROCYTOSIS: Status: ACTIVE | Noted: 2021-01-01

## 2021-06-29 PROBLEM — R06.02 SHORTNESS OF BREATH: Status: ACTIVE | Noted: 2021-01-01

## 2021-07-19 NOTE — DISCHARGE NOTE PROVIDER - HOSPITAL COURSE
79 y/o M with PMH HTN, HLD, IPF on home O2 who was noted with severe RV dilitation on echo. He has had worsening dyspnea on exertion and presents for R and [facility] for further evaluation.  Now s/p left heart catheterization via R radial artery and right brachial vein  performed by Dr. Qureshi with mild, nonobstructive CAD and severe pulmonary HTN PASP 80-90, wedge 21, no vasoreactivity with nitric oxide.  Pt received IA heparin and IV sedation intraprocedurally, arrived to recovery in NAD and HDS, R RA access site stable, no bleed/hematoma, distal pulse +.  Plan for medical management and referral to Dr Yao for management of pHTN  Pt tolerated procedure well  Stable for discharge home

## 2021-07-19 NOTE — DISCHARGE NOTE PROVIDER - CARE PROVIDER_API CALL
Max Yao)  Cardiovascular Disease  [address], [place] [address]  Phone: [phone]  Fax: [phone]  Follow Up Time:

## 2021-07-19 NOTE — PROGRESS NOTE ADULT - SUBJECTIVE AND OBJECTIVE BOX
Department of Cardiology                                                                  [facility]/[place] [address] [address] [id number]                                                            Telephone: [phone]. Fax:[phone]                                                    Post- Procedure Note: Right and Left Heart Cardiac Catheterization       79 y/o M with PMH HTN, HLD, IPF on home O2 who was noted with severe RV dilitation on echo. He has had worsening dyspnea on exertion and presents for R and [facility] for further evaluation.  Now s/p left heart catheterization via R radial artery and right brachial vein  performed by Dr. Qureshi with mild, nonobstructive CAD and severe pulmonary HTN PASP 80-90, wedge 21, no vasoreactivity with nitric oxide.  Pt received IA heparin and IV sedation intraprocedurally, arrived to recovery in NAD and HDS, R RA access site stable, no bleed/hematoma, distal pulse +.  Plan for medical management and referral to Dr Yao for management of pHTN  Pt tolerated procedure well  Plan for discharge after recovery period completed.    PAST MEDICAL & SURGICAL HISTORY:  HTN (hypertension)  HLD (hyperlipidemia)  IPF (idiopathic pulmonary fibrosis)  Prostate cancer S/P RT  Arthritis  S/P arthroscopy of knee  History of hip replacement  History of appendectomy    Home Medications:  amLODIPine 5 mg oral tablet: 1 tab(s) orally once a day (19 Jul 2021 09:14)  Aspirin Enteric Coated 81 mg oral delayed release tablet: 1 tab(s) orally once a day (19 Jul 2021 09:14)  Calcium 500+D oral tablet, chewable: 2 tab(s) orally once a day (at bedtime) (19 Jul 2021 09:14)  CoQ10 300 mg oral capsule: 1 cap(s) orally once a day (19 Jul 2021 09:14)  magnesium citrate 100 mg oral tablet: 1 tab(s) orally once (at bedtime) (19 Jul 2021 09:14)  Ofev 100 mg oral capsule: 1 cap(s) orally every 12 hours  take with food (19 Jul 2021 09:14)  Omega-3 1000 mg oral capsule: 1 cap(s) orally once a day (19 Jul 2021 09:14)  predniSONE 10 mg oral tablet: 1 tab(s) orally once a day (19 Jul 2021 09:14)  Vitamin B-12 500 mcg oral tablet: 1 tab(s) orally once a day (19 Jul 2021 09:14)  Vitamin D3 1000 intl units (25 mcg) oral capsule: 1 cap(s) orally once a day (19 Jul 2021 09:14)    Objective:  Vital Signs Last 24 Hrs  T(C): 36.5 (19 Jul 2021 08:59), Max: 36.5 (19 Jul 2021 08:59)  T(F): 97.7 (19 Jul 2021 08:59), Max: 97.7 (19 Jul 2021 08:59)  HR: 71 (19 Jul 2021 08:59) (71 - 71)  BP: 123/77 (19 Jul 2021 08:59) (123/77 - 123/77)  BP(mean): --  RR: 18 (19 Jul 2021 08:59) (18 - 18)  SpO2: 97% (19 Jul 2021 08:59) (97% - 97%)    CM: SR  Neuro: A&OX3, CN 2-12 intact  Lungs: Crackles L base with exp wheezing noted  CV: S1, S2, no murmur, RRR  Abd: Soft  Right radial band in place; soft, no bleeding, or hematoma. R hand WWP with motor and sensory intact. Radial 2+  Extremity: + distal pulses                          17.1   7.61  )-----------( 161      ( 19 Jul 2021 08:52 )             51.9     07-19    140  |  102  |  21.6<H>  ----------------------------<  105<H>  4.2   |  33.0<H>  |  1.21    Ca    9.4      19 Jul 2021 08:52  Mg     2.1     07-19      79 y/o M with PMH HTN, HLD, IPF on home O2 who was noted with severe RV dilitation on echo. He has had worsening dyspnea on exertion and presents for  and [facility] for further evaluation.  Now s/p left heart catheterization via R radial artery and right brachial vein  performed by Dr. Qureshi with mild, nonobstructive CAD and severe pulmonary HTN PASP 80-90, wedge 21, no vasoreactivity with nitric oxide.   Plan for medical management and referral to Dr Yao for management of pHTN    -post cardiac cath orders  -radial precautions  -bedrest x 1 hours post procedure  -continue current medical therapy  -anti platelet therapy with aspirin  -statin therapy  -Norvasc for HTN  -follow up outpt in 2 weeks with Cardiologist-Dr Yao  -Discharge today

## 2021-07-19 NOTE — H&P PST ADULT - NSICDXPASTMEDICALHX_GEN_ALL_CORE_FT
PAST MEDICAL HISTORY:  Arthritis     HLD (hyperlipidemia)     HTN (hypertension)     IPF (idiopathic pulmonary fibrosis)     Prostate cancer S/P RT

## 2021-07-19 NOTE — H&P PST ADULT - HISTORY OF PRESENT ILLNESS
79 y/o M with PMH HTN, HLD, IPF on home O2 who was noted with severe RV dilitation on echo. He has had worsening dyspnea on exertion and presents for R and [facility] for further evaluation.    ASA 3  Mallampati 2  GFR  BRA  Cr     Symptoms:        Angina (Class): 3       Ischemic Symptoms: Dyspnea    Heart Failure:        Systolic/Diastolic/Combined: NA       NYHA Class (within 2 weeks): NA    Assessment of LVEF (Must be within 6 months):       EF: >75%       Assessed by: Echo       Date: 6/19/21    Prior Cardiac Interventions (LHC, stents, CABG):       PCI's (Date, Stents, Vessels): NA       CABG (Date, Grafts): NA    Noninvasive Testing:   Stress Test: Date: 12/11/2017       Protocol: Regadenoson       Myocardial Imaging: Normal perfusion, EF 70%       Risk Assessment (Low, Medium, High):     Echo (Date, Findings): 6/19/21 mild LVH, RV volume overload, moderately enlarged, mod reduced RV function, mod dilated RA, PASP 70.2 mmHg c/w severe pulm HTN    Antianginal Therapies:        Beta Blockers:         Calcium Channel Blockers: Norvasc       Long Acting Nitrates:        Ranexa:     Associated Risk Factors:        Cerebrovascular Disease: N/A       Chronic Lung Disease: IPF       Peripheral Arterial Disease: N/A       Chronic Kidney Disease (if yes, what is GFR): N/A       Uncontrolled Diabetes (if yes, what is HgbA1C or FBS): N/A       Poorly Controlled Hypertension (if yes, what is SBP): N/A       Morbid Obesity (if yes, what is BMI): N/A       History of Recent Ventricular Arrhythmia: N/A       Inability to Ambulate Safely: N/A       Need for Therapeutic Anticoagulation: N/A       Antiplatelet or Contrast Allergy: N/A

## 2021-07-19 NOTE — DISCHARGE NOTE PROVIDER - NSDCCPTREATMENT_GEN_ALL_CORE_FT
PRINCIPAL PROCEDURE  Procedure: Left and right heart catheterization  Findings and Treatment: mild CAD; severe pulmonary hypertension

## 2021-07-19 NOTE — DISCHARGE NOTE NURSING/CASE MANAGEMENT/SOCIAL WORK - PATIENT PORTAL LINK FT
You can access the FollowMyHealth Patient Portal offered by [facility] by registering at the following website: [email or web address]. By joining [employer]’s FollowMyHealth portal, you will also be able to view your health information using other applications (apps) compatible with our system.

## 2021-07-19 NOTE — H&P PST ADULT - ASSESSMENT
79 y/o M with PMH HTN, HLD, IPF on home O2 with progressive MORALEZ and echocardiogram which demonstrates RV/RV enlargenment. He presents for R/LHC for further evaluation    NPO for procedure  Consent obtained by MD

## 2021-07-19 NOTE — DISCHARGE NOTE PROVIDER - NSDCMRMEDTOKEN_GEN_ALL_CORE_FT
amLODIPine 5 mg oral tablet: 1 tab(s) orally once a day  Aspirin Enteric Coated 81 mg oral delayed release tablet: 1 tab(s) orally once a day  Calcium 500+D oral tablet, chewable: 2 tab(s) orally once a day (at bedtime)  CoQ10 300 mg oral capsule: 1 cap(s) orally once a day  magnesium citrate 100 mg oral tablet: 1 tab(s) orally once (at bedtime)  Ofev 100 mg oral capsule: 1 cap(s) orally every 12 hours  take with food  Omega-3 1000 mg oral capsule: 1 cap(s) orally once a day  predniSONE 10 mg oral tablet: 1 tab(s) orally once a day  Vitamin B-12 500 mcg oral tablet: 1 tab(s) orally once a day  Vitamin D3 1000 intl units (25 mcg) oral capsule: 1 cap(s) orally once a day

## 2021-07-27 PROBLEM — J84.112 IDIOPATHIC PULMONARY FIBROSIS: Chronic | Status: ACTIVE | Noted: 2021-01-01

## 2021-07-27 PROBLEM — E78.5 HYPERLIPIDEMIA, UNSPECIFIED: Chronic | Status: ACTIVE | Noted: 2021-01-01

## 2021-07-27 PROBLEM — I10 ESSENTIAL (PRIMARY) HYPERTENSION: Chronic | Status: ACTIVE | Noted: 2021-01-01

## 2021-07-27 PROBLEM — C61 MALIGNANT NEOPLASM OF PROSTATE: Chronic | Status: ACTIVE | Noted: 2021-01-01

## 2021-07-27 PROBLEM — M19.90 UNSPECIFIED OSTEOARTHRITIS, UNSPECIFIED SITE: Chronic | Status: ACTIVE | Noted: 2021-01-01

## 2021-08-19 PROBLEM — I27.20 MODERATE TO SEVERE PULMONARY HYPERTENSION: Status: ACTIVE | Noted: 2021-01-01

## 2021-08-19 PROBLEM — J84.9 INTERSTITIAL PULMONARY DISEASE, UNSPECIFIED: Status: ACTIVE | Noted: 2020-01-01

## 2021-08-19 PROBLEM — J96.21 ACUTE ON CHRONIC RESPIRATORY FAILURE WITH HYPOXEMIA: Status: ACTIVE | Noted: 2021-01-01

## 2021-08-19 NOTE — DISCUSSION/SUMMARY
[FreeTextEntry1] : ILD favor more chronic Fibrotic HP than IPF,, CT scan stable reticular scarring , traction bronchiectasis, honey combing, mosaic, no GG, \par Chronic exertional dyspnea, CTA negative fro PE, echocardiogram with severe PH\par PAP 83/31 Mean 47, wedge 21, PVR 5 wood units, mixed pre and post capillary\par OFEV 100 bid\par prednisone 10 mg daily\par Cardiology input input noted, awaiting Tyvaso approval\par continue 02, increase 4 L with exercise\par repeat hepatic function panel, mildly elevated in June by PMD\par Needs 02 compliance, 24/7- importance stressed, per pt follow 02 at home\par Mild LILLY not requiring treatment given weight loss\par had covid vaccine x 2\par 3 months or sooner if needed, will call with Labs\par \par

## 2021-08-19 NOTE — CONSULT LETTER
[Dear  ___] : Dear  [unfilled], [Consult Letter:] : I had the pleasure of evaluating your patient, [unfilled]. [Please see my note below.] : Please see my note below. [Sincerely,] : Sincerely, [FreeTextEntry3] : Kalpesh Padilla DO [facility]\par Pulmonary Critical Care\par Director Pulmonary Division\par Medical Director Respiratory Therapy\par [facility]\par \par

## 2021-08-19 NOTE — PROCEDURE
[FreeTextEntry1] : CT scan 5/21: stable fibrosis\par CTA 7/21 no PE\par echo severe PH, PAP 70 \par PFTs 10/20 severe restriction and impaired DLCO, progressive decrease from 9/19

## 2021-08-19 NOTE — HISTORY OF PRESENT ILLNESS
[TextBox_4] :  no new pulmonary complaints\par chronic cough\par dyspnea at baseline\par off  cpap secondary to weight loss, uses  02 3L, nocturnal\par POC when ambulates 3L\par not using portable\par no fever, chills or chest pain\par no sig sputum\par abdominal complaints worse now  on OFEV 100 bid \par Moderna end of feb\par \par  [FreeTextEntry1] : \par

## 2021-09-09 ENCOUNTER — APPOINTMENT (OUTPATIENT)
Dept: CARDIOLOGY | Facility: CLINIC | Age: 81
End: 2021-09-09

## 2021-11-11 ENCOUNTER — APPOINTMENT (OUTPATIENT)
Dept: CARDIOLOGY | Facility: CLINIC | Age: 81
End: 2021-11-11

## 2021-11-29 ENCOUNTER — APPOINTMENT (OUTPATIENT)
Dept: PULMONOLOGY | Facility: CLINIC | Age: 81
End: 2021-11-29

## 2021-12-15 ENCOUNTER — APPOINTMENT (OUTPATIENT)
Dept: INTERNAL MEDICINE | Facility: CLINIC | Age: 81
End: 2021-12-15

## 2023-07-04 NOTE — DISCHARGE NOTE NURSING/CASE MANAGEMENT/SOCIAL WORK - MODE OF TRANSPORTATION
Normal rate, regular rhythm.  Heart sounds S1, S2.  No murmurs, rubs or gallops.
Wheelchair/Stroller

## 2023-12-31 PROBLEM — Z23 NEED FOR VACCINATION WITH 13-POLYVALENT PNEUMOCOCCAL CONJUGATE VACCINE: Status: RESOLVED | Noted: 2017-11-14 | Resolved: 2023-12-31
